# Patient Record
Sex: MALE | Race: WHITE | NOT HISPANIC OR LATINO | Employment: UNEMPLOYED | URBAN - METROPOLITAN AREA
[De-identification: names, ages, dates, MRNs, and addresses within clinical notes are randomized per-mention and may not be internally consistent; named-entity substitution may affect disease eponyms.]

---

## 2022-11-21 ENCOUNTER — OFFICE VISIT (OUTPATIENT)
Dept: URGENT CARE | Facility: CLINIC | Age: 27
End: 2022-11-21
Payer: MEDICAID

## 2022-11-21 VITALS
DIASTOLIC BLOOD PRESSURE: 78 MMHG | HEIGHT: 72 IN | OXYGEN SATURATION: 99 % | WEIGHT: 210 LBS | HEART RATE: 85 BPM | SYSTOLIC BLOOD PRESSURE: 142 MMHG | TEMPERATURE: 99 F | BODY MASS INDEX: 28.44 KG/M2 | RESPIRATION RATE: 18 BRPM

## 2022-11-21 DIAGNOSIS — R07.9 CHEST PAIN, UNSPECIFIED TYPE: Primary | ICD-10-CM

## 2022-11-21 DIAGNOSIS — R00.2 PALPITATIONS: ICD-10-CM

## 2022-11-21 LAB
EKG 12-LEAD: NORMAL
PR INTERVAL: NORMAL
PRT AXES: NORMAL
QRS DURATION: NORMAL
QT/QTC: NORMAL
VENTRICULAR RATE: NORMAL

## 2022-11-21 PROCEDURE — 93000 ELECTROCARDIOGRAM COMPLETE: CPT | Mod: ,,, | Performed by: PHYSICIAN ASSISTANT

## 2022-11-21 PROCEDURE — 99203 PR OFFICE/OUTPT VISIT, NEW, LEVL III, 30-44 MIN: ICD-10-PCS | Mod: ,,, | Performed by: PHYSICIAN ASSISTANT

## 2022-11-21 PROCEDURE — 99203 OFFICE O/P NEW LOW 30 MIN: CPT | Mod: ,,, | Performed by: PHYSICIAN ASSISTANT

## 2022-11-21 PROCEDURE — 93000 POCT EKG 12-LEAD: ICD-10-PCS | Mod: ,,, | Performed by: PHYSICIAN ASSISTANT

## 2022-11-21 RX ORDER — OMEPRAZOLE 20 MG/1
20 CAPSULE, DELAYED RELEASE ORAL DAILY
COMMUNITY
End: 2023-01-26 | Stop reason: SDUPTHER

## 2022-11-21 NOTE — PROGRESS NOTES
Subjective:       Patient ID: Mamadou Isaacs is a 27 y.o. male.    Vitals:  height is 6' (1.829 m) and weight is 95.3 kg (210 lb). His oral temperature is 98.5 °F (36.9 °C). His blood pressure is 142/78 (abnormal) and his pulse is 85. His respiration is 18 and oxygen saturation is 99%.     Chief Complaint: Shortness of Breath    SOB, chest tightness, heart racing, dizziness, blacking out feeling intermittently. Had episode yesterday afternoon and this am.  Patient denies any personal past medical history of cardiac or pulmonary disease.  Does have a family history of cardiac disease including atrial fibrillation.  ROS    Objective:      Physical Exam   HENT:   Head: Normocephalic and atraumatic.   Nose: Nose normal.   Cardiovascular: Normal rate, regular rhythm and normal heart sounds.   Pulmonary/Chest: Effort normal and breath sounds normal.   Abdominal: Normal appearance.   Neurological: He is alert.   Psychiatric: His behavior is normal. Mood normal.         EKG : tachycardia.  No observed acute ST-T wave changes.         Previous History      Review of patient's allergies indicates:  No Known Allergies    Past Medical History:   Diagnosis Date    GERD (gastroesophageal reflux disease)      Current Outpatient Medications   Medication Instructions    omeprazole (PRILOSEC) 20 mg, Oral, Daily     Past Surgical History:   Procedure Laterality Date    WISDOM TOOTH EXTRACTION       Family History   Problem Relation Age of Onset    No Known Problems Mother     No Known Problems Father     Atrial fibrillation Maternal Grandmother        Social History     Tobacco Use    Smoking status: Every Day     Types: Cigarettes     Passive exposure: Never    Smokeless tobacco: Never   Substance Use Topics    Alcohol use: Yes    Drug use: Not Currently     Comment: past drug use        Physical Exam      Vital Signs Reviewed   BP (!) 142/78   Pulse 85   Temp 98.5 °F (36.9 °C) (Oral)   Resp 18   Ht 6' (1.829 m)   Wt 95.3 kg  (210 lb)   SpO2 99%   BMI 28.48 kg/m²        Procedures    Procedures     Labs   No results found for this or any previous visit.    Assessment:       1. Chest pain, unspecified type    2. Palpitations          Plan:         Chest pain, unspecified type  -     POCT EKG 12-LEAD (NOT FOR [facility] USE)    Palpitations  -     POCT EKG 12-LEAD (NOT FOR [facility] USE)         As discussed, it is recommended that you present to the ER now for further evaluation to prevent a delay in care.   Offered transport via EMS but patient/family declines   Opts for private transport via personal vehicle.

## 2023-01-26 ENCOUNTER — OFFICE VISIT (OUTPATIENT)
Dept: FAMILY MEDICINE | Facility: CLINIC | Age: 28
End: 2023-01-26
Payer: MEDICAID

## 2023-01-26 VITALS
SYSTOLIC BLOOD PRESSURE: 128 MMHG | TEMPERATURE: 98 F | HEART RATE: 97 BPM | HEIGHT: 72 IN | BODY MASS INDEX: 31.29 KG/M2 | DIASTOLIC BLOOD PRESSURE: 84 MMHG | OXYGEN SATURATION: 100 % | RESPIRATION RATE: 18 BRPM | WEIGHT: 231 LBS

## 2023-01-26 DIAGNOSIS — F41.9 ANXIETY: Primary | ICD-10-CM

## 2023-01-26 DIAGNOSIS — R00.2 PALPITATIONS: ICD-10-CM

## 2023-01-26 DIAGNOSIS — K21.9 GASTROESOPHAGEAL REFLUX DISEASE WITHOUT ESOPHAGITIS: ICD-10-CM

## 2023-01-26 DIAGNOSIS — Z76.89 ENCOUNTER TO ESTABLISH CARE WITH NEW DOCTOR: ICD-10-CM

## 2023-01-26 PROCEDURE — 1160F PR REVIEW ALL MEDS BY PRESCRIBER/CLIN PHARMACIST DOCUMENTED: ICD-10-PCS | Mod: CPTII,,, | Performed by: NURSE PRACTITIONER

## 2023-01-26 PROCEDURE — 3074F PR MOST RECENT SYSTOLIC BLOOD PRESSURE < 130 MM HG: ICD-10-PCS | Mod: CPTII,,, | Performed by: NURSE PRACTITIONER

## 2023-01-26 PROCEDURE — 3079F DIAST BP 80-89 MM HG: CPT | Mod: CPTII,,, | Performed by: NURSE PRACTITIONER

## 2023-01-26 PROCEDURE — 99214 PR OFFICE/OUTPT VISIT, EST, LEVL IV, 30-39 MIN: ICD-10-PCS | Mod: S$PBB,,, | Performed by: NURSE PRACTITIONER

## 2023-01-26 PROCEDURE — 1159F MED LIST DOCD IN RCRD: CPT | Mod: CPTII,,, | Performed by: NURSE PRACTITIONER

## 2023-01-26 PROCEDURE — 99214 OFFICE O/P EST MOD 30 MIN: CPT | Mod: S$PBB,,, | Performed by: NURSE PRACTITIONER

## 2023-01-26 PROCEDURE — 1160F RVW MEDS BY RX/DR IN RCRD: CPT | Mod: CPTII,,, | Performed by: NURSE PRACTITIONER

## 2023-01-26 PROCEDURE — 3079F PR MOST RECENT DIASTOLIC BLOOD PRESSURE 80-89 MM HG: ICD-10-PCS | Mod: CPTII,,, | Performed by: NURSE PRACTITIONER

## 2023-01-26 PROCEDURE — 99215 OFFICE O/P EST HI 40 MIN: CPT | Mod: PBBFAC | Performed by: NURSE PRACTITIONER

## 2023-01-26 PROCEDURE — 93005 ELECTROCARDIOGRAM TRACING: CPT

## 2023-01-26 PROCEDURE — 3074F SYST BP LT 130 MM HG: CPT | Mod: CPTII,,, | Performed by: NURSE PRACTITIONER

## 2023-01-26 PROCEDURE — 3008F PR BODY MASS INDEX (BMI) DOCUMENTED: ICD-10-PCS | Mod: CPTII,,, | Performed by: NURSE PRACTITIONER

## 2023-01-26 PROCEDURE — 3008F BODY MASS INDEX DOCD: CPT | Mod: CPTII,,, | Performed by: NURSE PRACTITIONER

## 2023-01-26 PROCEDURE — 1159F PR MEDICATION LIST DOCUMENTED IN MEDICAL RECORD: ICD-10-PCS | Mod: CPTII,,, | Performed by: NURSE PRACTITIONER

## 2023-01-26 RX ORDER — BUSPIRONE HYDROCHLORIDE 5 MG/1
5 TABLET ORAL DAILY PRN
Qty: 30 TABLET | Refills: 1 | Status: SHIPPED | OUTPATIENT
Start: 2023-01-26 | End: 2023-03-06

## 2023-01-26 RX ORDER — OMEPRAZOLE 40 MG/1
40 CAPSULE, DELAYED RELEASE ORAL EVERY MORNING
Qty: 30 CAPSULE | Refills: 3 | Status: SHIPPED | OUTPATIENT
Start: 2023-01-26 | End: 2023-11-08 | Stop reason: SDUPTHER

## 2023-01-26 RX ORDER — OMEPRAZOLE 40 MG/1
1 CAPSULE, DELAYED RELEASE ORAL DAILY
COMMUNITY
End: 2023-01-26 | Stop reason: SDUPTHER

## 2023-01-26 RX ORDER — SUCRALFATE 1 G/1
1 TABLET ORAL 4 TIMES DAILY
Qty: 28 TABLET | Refills: 0 | Status: SHIPPED | OUTPATIENT
Start: 2023-01-26 | End: 2023-02-02

## 2023-01-26 NOTE — PROGRESS NOTES
Patient Name: Mamadou Isaacs   : 1995  MRN: [id number]     SUBJECTIVE DATA:    CHIEF COMPLAINT:   Mamadou Isaacs is a 27 y.o. male who presents to clinic today with Establish Care, Gastroesophageal Reflux, and Palpitations        HPI:  27-year-old male presents to the clinic with accompanied his mother to establish care.  Patient like to discuss gastroesophageal reflux, palpitation, anxiety.    Depression and anxiety:  PHQ -2 score is 0, SHI-7 score is 4.  Mother state her son has a history of drug abuse, previously was on Suboxone for detox.  Past medical medication citalopram where he stopped it abruptly started having psychosis.  patient stating he has been having anxiety where he started taking his grand mother Klonopin 0.1 mg as needed.  Patient state he takes it at least 3 times a week and it seems to help.  Instructed patient to stop taking medications not prescribed to him.  We discussed BusPar 5 mg p.o. once daily as needed for anxiety until he sees nurse practitioner Neeta Vera for depression medications that can target anxiety at the same time without the addiction properties.  Patient and his mother agreed for this temporary prescription of BusPar.  Precaution discussed.  Instructed patient if symptoms worsens to go to nearest emergency department or call 911.    GERD:  Patient  report gastroesophageal reflux for quite Some time.  Previously Pepcid.  Has abdominal attacks with tightness at epigastrium region.  Denies nausea vomiting.  He is taking omeprazole 40 mg p.o. but is not helping.  He is trying to monitor his dietary intake/GERD diet.   Seen at emergency department on 2022, ultrasound abdomen limited was performed with unremarkable gallbladder and surrounding structures. Discussed referral to gastro enterology for further testing and evaluation.  Will prescribe Carafate 1 g for 14 days.  Continue omeprazole 40 mg p.o. once daily. GERD education material provided.      Palpitation:  Blood pressure 128/84 with a apical pulse of 96 beats per minute.  Patient report having episodic chest tightness that start at the epigastrium region and then radiate to his left-sided chest, neck, and jaw.  He was seen in November 2022 at a local urgent care where was sent to emergency department further testing and evaluation.  Patient was discharged with 24 hour Holter monitor result shows no evidence of sustained arrhythmia or AV block.  Troponin  <0.01, CPK 0.3.Mag 1.9, K 4.3.  Patient was discharged from the ED at that time.  Patient returned to emergency department on December 13, 2022, D-dimer elevated at 1.83, troponin< 0.01, CT angiogram preliminary shows No acute cardiopulmonary abnormality. No evidence of pulmonary emboli, airspace disease or aortic dissection.  Ultrasound abdomen limited unremarkable gallbladder and surrounding structures.  Patient was discharge with hydroxyzine 25 mg p.o. q.i.d. as needed for anxiety.  Was asked to follow-up with PCP.  Last palpitation attack was a week ago.  EKG in clinic shows normal sinus rhythm with possible left atrial enlargement.  Cardiology referral initiated.    Discussed care gaps:  Patient to return in 4 weeks for a follow-up and fasting blood work.   HPI      ALLERGIES: Review of patient's allergies indicates:  No Known Allergies      ROS:  Review of Systems   Gastrointestinal:  Positive for heartburn.   Psychiatric/Behavioral:  The patient is nervous/anxious.    All other systems reviewed and are negative.      OBJECTIVE DATA:  Vital signs  Vitals:    01/26/23 1252   BP: 128/84   Pulse: 97   Resp: 18   Temp: 97.8 °F (36.6 °C)   TempSrc: Oral   SpO2: 100%   Weight: 104.8 kg (231 lb)   Height: 6' (1.829 m)      Body mass index is 31.33 kg/m².    PHYSICAL EXAM:   Physical Exam  Vitals and nursing note reviewed.   Constitutional:       General: He is not in acute distress.     Appearance: Normal appearance. He is normal weight. He is not  ill-appearing, toxic-appearing or diaphoretic.   HENT:      Head: Normocephalic and atraumatic.      Right Ear: Tympanic membrane, ear canal and external ear normal.      Left Ear: Tympanic membrane, ear canal and external ear normal.      Nose: Nose normal.      Mouth/Throat:      Lips: Pink.      Mouth: Mucous membranes are moist.      Pharynx: Oropharynx is clear. Uvula midline.   Eyes:      General: Lids are normal. Gaze aligned appropriately.      Extraocular Movements: Extraocular movements intact.      Conjunctiva/sclera: Conjunctivae normal.      Pupils: Pupils are equal, round, and reactive to light.   Neck:      Thyroid: No thyroid mass, thyromegaly or thyroid tenderness.      Trachea: Trachea and phonation normal.   Cardiovascular:      Rate and Rhythm: Normal rate and regular rhythm.      Pulses: Normal pulses.           Radial pulses are 2+ on the right side and 2+ on the left side.      Heart sounds: Normal heart sounds. No murmur heard.  Pulmonary:      Effort: Pulmonary effort is normal.      Breath sounds: Normal breath sounds and air entry. No wheezing or rhonchi.   Abdominal:      General: Bowel sounds are normal.      Palpations: Abdomen is soft.      Tenderness: There is abdominal tenderness. There is no right CVA tenderness or left CVA tenderness.      Hernia: There is no hernia in the umbilical area or ventral area.       Musculoskeletal:         General: Normal range of motion.      Cervical back: Full passive range of motion without pain, normal range of motion and neck supple. No rigidity or tenderness.   Lymphadenopathy:      Cervical: No cervical adenopathy.   Skin:     General: Skin is warm.      Capillary Refill: Capillary refill takes less than 2 seconds.   Neurological:      General: No focal deficit present.      Mental Status: He is alert and oriented to person, place, and time. Mental status is at baseline.      GCS: GCS eye subscore is 4. GCS verbal subscore is 5. GCS motor  subscore is 6.      Cranial Nerves: Cranial nerves 2-12 are intact.      Sensory: Sensation is intact.      Coordination: Coordination is intact.      Gait: Gait is intact.   Psychiatric:         Attention and Perception: Attention and perception normal.         Mood and Affect: Mood and affect normal.         Speech: Speech normal.         Behavior: Behavior normal. Behavior is cooperative.         Thought Content: Thought content normal.         Cognition and Memory: Cognition and memory normal.         Judgment: Judgment normal.        ASSESSMENT/PLAN:  1. Anxiety  Assessment & Plan:  Rx BuSpar 5 mg p.o. as needed for anxiety.  Herson -7 score is 4.  Stop taking grandmother's medications for anxiety.  If symptoms worsens go to nearest emergency department.  Behavioral health referral initiated.  Questions solicited and answered patient verbalized.    Orders:  -     busPIRone (BUSPAR) 5 MG Tab; Take 1 tablet (5 mg total) by mouth daily as needed (anxiety).  Dispense: 30 tablet; Refill: 1  -     Ambulatory referral/consult to Family Practice; Future; Expected date: 02/02/2023    2. Palpitations  Assessment & Plan:  EKG performed in the clinic.  Normal sinus rhythm with possible left atrial enlargement.  Instructed patient if symptoms worsens to go to nearest emergency department or call 911.  Cardiology referral initiated.    Orders:  -     EKG 12-lead  -     Ambulatory referral/consult to Cardiology; Future; Expected date: 02/02/2023    3. Gastroesophageal reflux disease without esophagitis  Assessment & Plan:  Rx Carafate 1 g p.o. take 30 minutes before meals.  For 14 days.  If you start having constipation use OTC MiraLax as directed on the label.  Read discharge education material regarding GERD.  Continue omeprazole 40 mg p.o. once daily.  Gastroenterology referral initiated.  Return to clinic as needed.    Orders:  -     omeprazole (PRILOSEC) 40 MG capsule; Take 1 capsule (40 mg total) by mouth every morning.   Dispense: 30 capsule; Refill: 3  -     sucralfate (CARAFATE) 1 gram tablet; Take 1 tablet (1 g total) by mouth 4 (four) times daily. for 7 days  Dispense: 28 tablet; Refill: 0  -     Ambulatory referral/consult to Gastroenterology; Future; Expected date: 02/02/2023    4. Encounter to establish care with new doctor  Assessment & Plan:  Patient to return to clinic in 4 weeks for a follow-up.  Will discuss care gaps on next visit.  Will draw fasting blood work.  Patient verbalized.    Orders:  -     busPIRone (BUSPAR) 5 MG Tab; Take 1 tablet (5 mg total) by mouth daily as needed (anxiety).  Dispense: 30 tablet; Refill: 1  -     sucralfate (CARAFATE) 1 gram tablet; Take 1 tablet (1 g total) by mouth 4 (four) times daily. for 7 days  Dispense: 28 tablet; Refill: 0  -     Ambulatory referral/consult to Gastroenterology; Future; Expected date: 02/02/2023  -     Ambulatory referral/consult to Cardiology; Future; Expected date: 02/02/2023  -     Ambulatory referral/consult to Family Practice; Future; Expected date: 02/02/2023           RESULTS:  No results found for this or any previous visit (from the past 1008 hour(s)).      Follow Up:  Follow up in about 4 weeks (around 2/23/2023).      Previous medical history/lab work/radiology reviewed and considered during medical management decisions.   Medication list reviewed and medication reconciliation performed.  Patient was provided  and care about his/her current diagnosis (es) and medications including risk/benefit and side effects/adverse events, over the counter medication uses/doses, home self-care and contact precautions,  and red flags and indications for when to seek immediate medical attention.   Patient was advised to continue compliance with current medication list and medical recommendations.  Patient dvised continued compliance with recommended eating habits/ diets for medical conditions and exercise 150 minutes/ week (if possible) for medical condition  (s).  Educational handouts and instructions on selected disease management in AVS (After Visit Summary).    All of the patient's questions were answered to patient's satisfaction.   The patient was receptive, expressed verbal understanding and agreement the above plan.           This note was created with the assistance of a voice recognition software or phone dictation. There may be transcription errors as a result of using this technology however minimal. Effort has been made to assure accuracy of transcription but any obvious errors or omissions should be clarified with the author of the document

## 2023-01-26 NOTE — ASSESSMENT & PLAN NOTE
Rx Carafate 1 g p.o. take 30 minutes before meals.  For 14 days.  If you start having constipation use OTC MiraLax as directed on the label.  Read discharge education material regarding GERD.  Continue omeprazole 40 mg p.o. once daily.  Gastroenterology referral initiated.  Return to clinic as needed.

## 2023-01-26 NOTE — ASSESSMENT & PLAN NOTE
Patient to return to clinic in 4 weeks for a follow-up.  Will discuss care gaps on next visit.  Will draw fasting blood work.  Patient verbalized.

## 2023-01-26 NOTE — ASSESSMENT & PLAN NOTE
Rx BuSpar 5 mg p.o. as needed for anxiety.  Herson -7 score is 4.  Stop taking grandmother's medications for anxiety.  If symptoms worsens go to nearest emergency department.  Behavioral health referral initiated.  Questions solicited and answered patient verbalized.

## 2023-01-26 NOTE — ASSESSMENT & PLAN NOTE
EKG performed in the clinic.  Normal sinus rhythm with possible left atrial enlargement.  Instructed patient if symptoms worsens to go to nearest emergency department or call 911.  Cardiology referral initiated.

## 2023-01-30 NOTE — PROGRESS NOTES
Please notify the patient that the EKG that was done in the clinic has been read by the cardiologist and results shows normal sinus rhythm no abnormalities.

## 2023-01-31 ENCOUNTER — TELEPHONE (OUTPATIENT)
Dept: FAMILY MEDICINE | Facility: CLINIC | Age: 28
End: 2023-01-31
Payer: MEDICAID

## 2023-01-31 NOTE — TELEPHONE ENCOUNTER
----- Message from PHAN Hemphill sent at 1/30/2023  7:50 AM CST -----  Please notify the patient that the EKG that was done in the clinic has been read by the cardiologist and results shows normal sinus rhythm no abnormalities.

## 2023-03-06 ENCOUNTER — OFFICE VISIT (OUTPATIENT)
Dept: CARDIOLOGY | Facility: CLINIC | Age: 28
End: 2023-03-06
Payer: MEDICAID

## 2023-03-06 VITALS
HEIGHT: 72 IN | OXYGEN SATURATION: 100 % | HEART RATE: 80 BPM | BODY MASS INDEX: 30.99 KG/M2 | WEIGHT: 228.81 LBS | RESPIRATION RATE: 18 BRPM | TEMPERATURE: 98 F | DIASTOLIC BLOOD PRESSURE: 95 MMHG | SYSTOLIC BLOOD PRESSURE: 130 MMHG

## 2023-03-06 DIAGNOSIS — F17.200 SMOKING: ICD-10-CM

## 2023-03-06 DIAGNOSIS — R07.89 CHEST TIGHTNESS: ICD-10-CM

## 2023-03-06 DIAGNOSIS — R00.2 PALPITATIONS: Primary | ICD-10-CM

## 2023-03-06 DIAGNOSIS — R03.0 ELEVATED BP WITHOUT DIAGNOSIS OF HYPERTENSION: ICD-10-CM

## 2023-03-06 DIAGNOSIS — Z76.89 ENCOUNTER TO ESTABLISH CARE WITH NEW DOCTOR: ICD-10-CM

## 2023-03-06 PROBLEM — R07.9 CHEST PAIN: Status: RESOLVED | Noted: 2022-11-21 | Resolved: 2023-03-06

## 2023-03-06 PROCEDURE — 99214 OFFICE O/P EST MOD 30 MIN: CPT | Mod: PBBFAC | Performed by: INTERNAL MEDICINE

## 2023-03-06 NOTE — PROGRESS NOTES
CHIEF COMPLAINT:   Chief Complaint   Patient presents with    Palpitations    Follow-up     Palpitations cp tightness and jaw tightness also wore 48 holter with neg results                   Review of patient's allergies indicates:  No Known Allergies                                       HPI:  Mamadou Isaacs 28 y.o. male with GERD, previous opiate use disorder  who is referred for further evaluation of palpitations and associated chest tightness that has been occurring since Nov 2022. Pt had 2 ED visits at [facility] in Nov and Dec 2022 for palpitations, chest pain and presyncope. He had EKGs done that were WNL (report only available). Trop negative in both ED visits. He wore a 48 hour Holter monitor that was reportedly normal.   He reports episodes have been occurring less frequent recently. They occur randomly, about once or twice a week and usually occur in the afternoon. Episodes last 2 hours to half a day. He denies exertional dyspnea but reports if he overexerts himself at work he experiences some palpitations and chest tightness ans has been taking it easy.                                                                                                                                                                                          Family hx:  Maternal GM Afib, maternal uncle afib, paternal GF had MI in the 40s, father HTN    Social hx:   Works in [employer]), smokes < 1/2 ppd, 5py hx, occasional alcohol, used to use opiates and heroin but has been clean for 6 years                                         Patient Active Problem List   Diagnosis    Palpitations    Chest pain    Gastroesophageal reflux disease    Anxiety    Encounter to establish care with new doctor     Past Surgical History:   Procedure Laterality Date    WISDOM TOOTH EXTRACTION       Social History     Socioeconomic History    Marital status: Single   Tobacco Use    Smoking status: Every Day     Packs/day: 0.50     Years:  8.00     Pack years: 4.00     Types: Cigarettes     Passive exposure: Never    Smokeless tobacco: Never   Substance and Sexual Activity    Alcohol use: Yes     Alcohol/week: 12.0 standard drinks     Types: 12 Cans of beer per week    Drug use: Not Currently     Types: Benzodiazepines, Marijuana     Comment: past drug use.  Heroin.  Oxycodone.    Sexual activity: Yes     Partners: Female        Family History   Problem Relation Age of Onset    Depression Mother     Hyperlipidemia Mother     Hypertension Mother     Hypertension Father     Diabetes Father     Hyperlipidemia Father     Atrial fibrillation Maternal Grandmother     Depression Maternal Grandmother     Cancer Maternal Grandfather     Diabetes Paternal Grandfather          Current Outpatient Medications:     omeprazole (PRILOSEC) 40 MG capsule, Take 1 capsule (40 mg total) by mouth every morning., Disp: 30 capsule, Rfl: 3     ROS:                                                                                                                                                                             ROS   As per HPI    Blood pressure (!) 146/102, pulse 80, temperature 98 °F (36.7 °C), resp. rate 18, height 6' (1.829 m), weight 103.8 kg (228 lb 12.8 oz), SpO2 100 %.   PE:  Physical Exam   General: alert and oriented/no acute distress  Eye: EOMI/normal conjunctiva/no xanthelasma  HENT: normocephalic/moist oral mucosa  Neck: supple/nontender/no carotid bruit  Respiratory: lungs CTA/nonlabored respirations/BS equal/symmetrical expansion/no  chest wall tenderness  Cardiovascular: normal rate/normal rhythm/no murmur/normal peripheral perfusion/no  edema/no JVD  Gastrointestinal: soft/nontender  Musculoskeletal: normal ROM  Integumentary: warm/dry/pink/intact  Neurologic: alert/oriented/normal sensory/no focal deficits  Psychiatric: cooperative/appropriate mood and affect/normal judgment          ASSESSMENT/PLAN  Palpitations  Chest tightness  EKG today and in Jan  2023 show NSR  ED visits at [facility] with reportedly normal EKG, also normal trop in 2 ED visits  As symptoms not daily, will obtain a longer monitor for 2 weeks  Given smoking hx and family hx of CAD, will obtain a ETT   Will also obtain an echocardiogram to rule out structural heart disease      Smoking  Discussed deleterious effects of smoking and how smoking decreases lifespan.  Pt wants to quit and wants to try on his own  Explained that resources are available if needed including smoking cessation program     Elevated BP without diagnosis of HTN  /102 with repeat 130/95  Recent BP in clinic was 128/84  Advised pt to keep a BP log to review on next visit or with PCP      4m F/U

## 2023-03-08 ENCOUNTER — OFFICE VISIT (OUTPATIENT)
Dept: FAMILY MEDICINE | Facility: CLINIC | Age: 28
End: 2023-03-08
Payer: MEDICAID

## 2023-03-08 VITALS
TEMPERATURE: 98 F | RESPIRATION RATE: 16 BRPM | OXYGEN SATURATION: 99 % | SYSTOLIC BLOOD PRESSURE: 133 MMHG | BODY MASS INDEX: 31.15 KG/M2 | WEIGHT: 230 LBS | HEIGHT: 72 IN | HEART RATE: 85 BPM | DIASTOLIC BLOOD PRESSURE: 77 MMHG

## 2023-03-08 DIAGNOSIS — Z11.59 ENCOUNTER FOR HEPATITIS C SCREENING TEST FOR LOW RISK PATIENT: ICD-10-CM

## 2023-03-08 DIAGNOSIS — Z13.6 ENCOUNTER FOR LIPID SCREENING FOR CARDIOVASCULAR DISEASE: ICD-10-CM

## 2023-03-08 DIAGNOSIS — Z11.4 ENCOUNTER FOR HIV (HUMAN IMMUNODEFICIENCY VIRUS) TEST: ICD-10-CM

## 2023-03-08 DIAGNOSIS — Z13.220 ENCOUNTER FOR LIPID SCREENING FOR CARDIOVASCULAR DISEASE: ICD-10-CM

## 2023-03-08 DIAGNOSIS — R21 RASH: ICD-10-CM

## 2023-03-08 DIAGNOSIS — Z00.00 ANNUAL PHYSICAL EXAM: Primary | ICD-10-CM

## 2023-03-08 LAB
ALBUMIN SERPL-MCNC: 4.2 G/DL (ref 3.5–5)
ALBUMIN/GLOB SERPL: 1 RATIO (ref 1.1–2)
ALP SERPL-CCNC: 94 UNIT/L (ref 40–150)
ALT SERPL-CCNC: 20 UNIT/L (ref 0–55)
AST SERPL-CCNC: 23 UNIT/L (ref 5–34)
BILIRUB UR QL STRIP: NEGATIVE
BILIRUBIN DIRECT+TOT PNL SERPL-MCNC: 0.7 MG/DL
BUN SERPL-MCNC: 8 MG/DL (ref 8.9–20.6)
CALCIUM SERPL-MCNC: 9.8 MG/DL (ref 8.4–10.2)
CHLORIDE SERPL-SCNC: 102 MMOL/L (ref 98–107)
CHOLEST SERPL-MCNC: 170 MG/DL
CHOLEST/HDLC SERPL: 4 {RATIO} (ref 0–5)
CO2 SERPL-SCNC: 27 MMOL/L (ref 22–29)
CREAT SERPL-MCNC: 0.81 MG/DL (ref 0.73–1.18)
ERYTHROCYTE [DISTWIDTH] IN BLOOD BY AUTOMATED COUNT: 14.2 % (ref 11.5–17)
GFR SERPLBLD CREATININE-BSD FMLA CKD-EPI: >60 MLS/MIN/1.73/M2
GLOBULIN SER-MCNC: 4.3 GM/DL (ref 2.4–3.5)
GLUCOSE SERPL-MCNC: 86 MG/DL (ref 74–100)
GLUCOSE UR QL STRIP: NEGATIVE
HCT VFR BLD AUTO: 49.5 % (ref 42–52)
HCV AB SERPL QL IA: NONREACTIVE
HDLC SERPL-MCNC: 38 MG/DL (ref 35–60)
HGB BLD-MCNC: 16.3 G/DL (ref 14–18)
HIV 1+2 AB+HIV1 P24 AG SERPL QL IA: NONREACTIVE
KETONES UR QL STRIP: NEGATIVE
LDLC SERPL CALC-MCNC: 98 MG/DL (ref 50–140)
LEUKOCYTE ESTERASE UR QL STRIP: NEGATIVE
MCH RBC QN AUTO: 28 PG
MCHC RBC AUTO-ENTMCNC: 32.9 G/DL (ref 33–36)
MCV RBC AUTO: 85.1 FL (ref 80–94)
NRBC BLD AUTO-RTO: 0 %
PH, POC UA: 8.5
PLATELET # BLD AUTO: 270 X10(3)/MCL (ref 130–400)
PMV BLD AUTO: 10.8 FL (ref 7.4–10.4)
POC BLOOD, URINE: NEGATIVE
POC NITRATES, URINE: NEGATIVE
POTASSIUM SERPL-SCNC: 4.1 MMOL/L (ref 3.5–5.1)
PROT SERPL-MCNC: 8.5 GM/DL (ref 6.4–8.3)
PROT UR QL STRIP: NEGATIVE
RBC # BLD AUTO: 5.82 X10(6)/MCL (ref 4.7–6.1)
SODIUM SERPL-SCNC: 138 MMOL/L (ref 136–145)
SP GR UR STRIP: 1.01 (ref 1–1.03)
TRIGL SERPL-MCNC: 171 MG/DL (ref 34–140)
TSH SERPL-ACNC: 1.02 UIU/ML (ref 0.35–4.94)
UROBILINOGEN UR STRIP-ACNC: 0.2 (ref 0.3–2.2)
VLDLC SERPL CALC-MCNC: 34 MG/DL
WBC # SPEC AUTO: 11.7 X10(3)/MCL (ref 4.5–11.5)

## 2023-03-08 PROCEDURE — 84443 ASSAY THYROID STIM HORMONE: CPT | Performed by: NURSE PRACTITIONER

## 2023-03-08 PROCEDURE — 1159F PR MEDICATION LIST DOCUMENTED IN MEDICAL RECORD: ICD-10-PCS | Mod: CPTII,,, | Performed by: NURSE PRACTITIONER

## 2023-03-08 PROCEDURE — 3075F SYST BP GE 130 - 139MM HG: CPT | Mod: CPTII,,, | Performed by: NURSE PRACTITIONER

## 2023-03-08 PROCEDURE — 3008F BODY MASS INDEX DOCD: CPT | Mod: CPTII,,, | Performed by: NURSE PRACTITIONER

## 2023-03-08 PROCEDURE — 3078F PR MOST RECENT DIASTOLIC BLOOD PRESSURE < 80 MM HG: ICD-10-PCS | Mod: CPTII,,, | Performed by: NURSE PRACTITIONER

## 2023-03-08 PROCEDURE — 1159F MED LIST DOCD IN RCRD: CPT | Mod: CPTII,,, | Performed by: NURSE PRACTITIONER

## 2023-03-08 PROCEDURE — 3075F PR MOST RECENT SYSTOLIC BLOOD PRESS GE 130-139MM HG: ICD-10-PCS | Mod: CPTII,,, | Performed by: NURSE PRACTITIONER

## 2023-03-08 PROCEDURE — 3078F DIAST BP <80 MM HG: CPT | Mod: CPTII,,, | Performed by: NURSE PRACTITIONER

## 2023-03-08 PROCEDURE — 36415 COLL VENOUS BLD VENIPUNCTURE: CPT | Performed by: NURSE PRACTITIONER

## 2023-03-08 PROCEDURE — 80053 COMPREHEN METABOLIC PANEL: CPT | Performed by: NURSE PRACTITIONER

## 2023-03-08 PROCEDURE — 86803 HEPATITIS C AB TEST: CPT | Performed by: NURSE PRACTITIONER

## 2023-03-08 PROCEDURE — 1160F RVW MEDS BY RX/DR IN RCRD: CPT | Mod: CPTII,,, | Performed by: NURSE PRACTITIONER

## 2023-03-08 PROCEDURE — 99214 OFFICE O/P EST MOD 30 MIN: CPT | Mod: S$PBB,,, | Performed by: NURSE PRACTITIONER

## 2023-03-08 PROCEDURE — 1160F PR REVIEW ALL MEDS BY PRESCRIBER/CLIN PHARMACIST DOCUMENTED: ICD-10-PCS | Mod: CPTII,,, | Performed by: NURSE PRACTITIONER

## 2023-03-08 PROCEDURE — 87389 HIV-1 AG W/HIV-1&-2 AB AG IA: CPT | Performed by: NURSE PRACTITIONER

## 2023-03-08 PROCEDURE — 99214 PR OFFICE/OUTPT VISIT, EST, LEVL IV, 30-39 MIN: ICD-10-PCS | Mod: S$PBB,,, | Performed by: NURSE PRACTITIONER

## 2023-03-08 PROCEDURE — 99214 OFFICE O/P EST MOD 30 MIN: CPT | Mod: PBBFAC | Performed by: NURSE PRACTITIONER

## 2023-03-08 PROCEDURE — 85027 COMPLETE CBC AUTOMATED: CPT | Performed by: NURSE PRACTITIONER

## 2023-03-08 PROCEDURE — 80061 LIPID PANEL: CPT | Performed by: NURSE PRACTITIONER

## 2023-03-08 PROCEDURE — 81003 URINALYSIS AUTO W/O SCOPE: CPT | Mod: PBBFAC | Performed by: NURSE PRACTITIONER

## 2023-03-08 PROCEDURE — 3008F PR BODY MASS INDEX (BMI) DOCUMENTED: ICD-10-PCS | Mod: CPTII,,, | Performed by: NURSE PRACTITIONER

## 2023-03-08 RX ORDER — HYDROCORTISONE 25 MG/G
OINTMENT TOPICAL 2 TIMES DAILY
Qty: 28 G | Refills: 1 | Status: SHIPPED | OUTPATIENT
Start: 2023-03-08 | End: 2023-09-08 | Stop reason: ALTCHOICE

## 2023-03-08 RX ORDER — MUPIROCIN 20 MG/G
OINTMENT TOPICAL
Qty: 22 G | Refills: 1 | Status: SHIPPED | OUTPATIENT
Start: 2023-03-08 | End: 2023-03-15

## 2023-03-08 NOTE — ASSESSMENT & PLAN NOTE
Patient will call the office with dermatologist name for referral. Few area looks infected specially to his lower abdomen and inner thigh.  Rx cortisone topical ointment to be mixed with Rx Bactroban topical ointment b.i.d. 7 days and then as needed.  Maintain good hand hygiene as well body.  Stay hydrated with water.  Questions solicited and answered, patient verbalized.

## 2023-03-08 NOTE — PROGRESS NOTES
Patient Name: Mamadou Isaacs   : 1995  MRN: [id number]     SUBJECTIVE DATA:    CHIEF COMPLAINT:   Mamadou Isaacs is a 28 y.o. male who presents to clinic today with Follow-up (Routine follow up for anxiety and reflux. )        HPI:  27-year-old male presents to the clinic for a yearly physical exam with fasting blood work.    Yearly physical exam completed, fasting blood work initiated as well urinalysis.     Rash:  Patient has history of acne, untreated.  Instructed patient to contact his primary insurance coverage Medicaid and find out name of the dermatologist under his plan.  Patient will call the office with dermatologist name for referral. Few area looks infected specially to his lower abdomen and inner thigh.  Rx cortisone topical ointment to be mixed with Rx Bactroban topical ointment b.i.d. 7 days and then as needed.  Maintain good hand hygiene as well body.  Stay hydrated with water.  Questions solicited and answered, patient verbalized.      Depression and anxiety:  PHQ -2 score is 0, SHI-7 score is 0.  Patient currently takes BuSpar 5 mg p.o. p.r.n. as needed for anxiety.  Patient state he does not use it often.  Patient state he has a follow-up appointment coming up with Carmelina Vera this month for a follow-up.  Denies any suicidal homicide ideation.  Patient to continue medication as prescribed.  Questions solicited and answered, patient verbalized.     GERD:  Patient state upper GI scope with Dr. Jaramillo, results pending.  Patient state omeprazole is helping with reflux. He is trying to monitor his dietary intake/GERD diet.  Awaiting results from Dr. Jaramillo.  Patient denies any nausea or vomiting or diarrhea abdominal pain.     Palpitation:  Blood pressure 133/77 with a apical pulse of 82 beats per minute.  Patient was referred to Cardiology and he was seen on 2023.  Patient to wear Holter monitor and also echo was ordered.  Patient to follow-up with Cardiology.  Denies any  chest pain or short of breath. HPI    Care gaps:  Patient declined smoke cessation, patient continued to decline immunization and was ask not to be asked again regarding vaccination.    Patient denies chest pain, shortness of breath, dyspnea on exertion, palpitations, peripheral edema, abdominal pain, nausea, vomiting, diarrhea, constipation, fatigue, fever, chills, dysuria,  hematuria, melena, or hematochezia.     02/28/2023 patient was seen at [facility] diagnostic center with Dr. Jaramillo, EGD was performed, impressions:  See full report under scanned media  Erythema in the lower 3rd of the esophagus compatible with esophagitis.  Erythemic in the antrum compatible with gastritis with biopsy/pending.  Normal mucosa in the duodenum.  Esophageal hiatal hernia.  Ring in the gastroesophageal junction with dilation/Schatzki's ring.  Esophageal dilation diet provided to resume regular diet in 3 days.  Continue omeprazole.    ALLERGIES: Review of patient's allergies indicates:  No Known Allergies      ROS:  Review of Systems   All other systems reviewed and are negative.      OBJECTIVE DATA:  Vital signs  Vitals:    03/08/23 1011   BP: 133/77   Pulse: 85   Resp: 16   Temp: 97.9 °F (36.6 °C)   SpO2: 99%   Weight: 104.3 kg (230 lb)   Height: 6' (1.829 m)      Body mass index is 31.19 kg/m².    PHYSICAL EXAM:   Physical Exam  Vitals and nursing note reviewed.   Constitutional:       General: He is awake. He is not in acute distress.     Appearance: Normal appearance. He is well-developed, well-groomed and overweight. He is not ill-appearing, toxic-appearing or diaphoretic.   HENT:      Head: Normocephalic and atraumatic.      Right Ear: Hearing, tympanic membrane, ear canal and external ear normal. There is no impacted cerumen.      Left Ear: Hearing, tympanic membrane, ear canal and external ear normal. There is no impacted cerumen.      Nose: Nose normal. No congestion or rhinorrhea.      Right Nostril: No epistaxis.      Left  Nostril: No epistaxis.      Right Turbinates: Not swollen.      Left Turbinates: Not swollen.      Right Sinus: No maxillary sinus tenderness or frontal sinus tenderness.      Left Sinus: No maxillary sinus tenderness or frontal sinus tenderness.      Mouth/Throat:      Lips: Pink.      Mouth: Mucous membranes are moist.      Pharynx: Oropharynx is clear. Uvula midline. No posterior oropharyngeal erythema.   Eyes:      General: Lids are normal. Gaze aligned appropriately.      Extraocular Movements: Extraocular movements intact.      Conjunctiva/sclera: Conjunctivae normal.      Pupils: Pupils are equal, round, and reactive to light.   Neck:      Thyroid: No thyroid mass, thyromegaly or thyroid tenderness.      Trachea: Trachea and phonation normal.   Cardiovascular:      Rate and Rhythm: Normal rate and regular rhythm.      Pulses: Normal pulses.           Radial pulses are 2+ on the right side and 2+ on the left side.        Femoral pulses are 2+ on the right side and 2+ on the left side.       Dorsalis pedis pulses are 2+ on the right side and 2+ on the left side.        Posterior tibial pulses are 2+ on the right side and 2+ on the left side.      Heart sounds: Normal heart sounds.   Pulmonary:      Effort: Pulmonary effort is normal.      Breath sounds: Normal breath sounds and air entry.   Abdominal:      General: Bowel sounds are normal. There is no distension.      Palpations: Abdomen is soft. There is no mass.      Tenderness: There is no abdominal tenderness. There is no right CVA tenderness, left CVA tenderness, guarding or rebound.      Hernia: No hernia is present.   Genitourinary:     Penis: Normal.       Testes: Normal.      Rectum: Normal.   Musculoskeletal:         General: No swelling, tenderness, deformity or signs of injury. Normal range of motion.      Cervical back: Full passive range of motion without pain, normal range of motion and neck supple. No rigidity or tenderness.      Right lower  leg: No edema.      Left lower leg: No edema.   Lymphadenopathy:      Cervical: No cervical adenopathy.      Right cervical: No superficial cervical adenopathy.     Left cervical: No superficial cervical adenopathy.   Skin:     General: Skin is warm.      Capillary Refill: Capillary refill takes less than 2 seconds.      Coloration: Skin is not jaundiced or pale.      Findings: Erythema and rash (Patient is dealing with acne.  Acne like to his lower abdomen and  his inner thighs erythemic skin peeling possibly from rubbing as well scratching his skin.  No drainage, no bleeding.) present. No bruising or lesion.   Neurological:      General: No focal deficit present.      Mental Status: He is alert and oriented to person, place, and time. Mental status is at baseline.      GCS: GCS eye subscore is 4. GCS verbal subscore is 5. GCS motor subscore is 6.      Cranial Nerves: Cranial nerves 2-12 are intact.      Sensory: Sensation is intact.      Motor: Motor function is intact.      Coordination: Coordination is intact.      Gait: Gait is intact. Gait normal.      Deep Tendon Reflexes: Reflexes normal.   Psychiatric:         Attention and Perception: Attention and perception normal.         Mood and Affect: Mood and affect normal.         Speech: Speech normal.         Behavior: Behavior normal. Behavior is cooperative.         Thought Content: Thought content normal.         Cognition and Memory: Cognition and memory normal.         Judgment: Judgment normal.        ASSESSMENT/PLAN:  1. Annual physical exam  -     HIV 1/2 Ag/Ab (4th Gen)  -     Hepatitis C Antibody  -     Lipid Panel  -     CBC Without Differential  -     TSH  -     Comprehensive Metabolic Panel  -     POCT Urinalysis, Dipstick, Automated, W/O Scope  -     hydrocortisone 2.5 % ointment; Apply topically 2 (two) times daily. for 7 days  Dispense: 28 g; Refill: 1  -     mupirocin (BACTROBAN) 2 % ointment; Apply to affected area 2 times daily  Dispense: 22  g; Refill: 1    2. Rash  Assessment & Plan:  Patient will call the office with dermatologist name for referral. Few area looks infected specially to his lower abdomen and inner thigh.  Rx cortisone topical ointment to be mixed with Rx Bactroban topical ointment b.i.d. 7 days and then as needed.  Maintain good hand hygiene as well body.  Stay hydrated with water.  Questions solicited and answered, patient verbalized.    Orders:  -     hydrocortisone 2.5 % ointment; Apply topically 2 (two) times daily. for 7 days  Dispense: 28 g; Refill: 1  -     mupirocin (BACTROBAN) 2 % ointment; Apply to affected area 2 times daily  Dispense: 22 g; Refill: 1    3. Encounter for lipid screening for cardiovascular disease  -     Lipid Panel    4. Encounter for HIV (human immunodeficiency virus) test  -     HIV 1/2 Ag/Ab (4th Gen)    5. Encounter for hepatitis C screening test for low risk patient  -     Hepatitis C Antibody           RESULTS:  Recent Results (from the past 1008 hour(s))   POCT Urinalysis, Dipstick, Automated, W/O Scope    Collection Time: 03/08/23 10:58 AM   Result Value Ref Range    POC Blood, Urine Negative Negative    POC Bilirubin, Urine Negative Negative    POC Urobilinogen, Urine 0.2 (A) 0.3 - 2.2    POC Ketones, Urine Negative Negative    POC Protein, Urine Negative Negative    POC Nitrates, Urine Negative Negative    POC Glucose, Urine Negative Negative    pH, UA 8.5     POC Specific Gravity, Urine 1.015 1.003 - 1.029    POC Leukocytes, Urine Negative Negative         Follow Up:  Follow up in about 6 months (around 9/8/2023).      Previous medical history/lab work/radiology reviewed and considered during medical management decisions.   Medication list reviewed and medication reconciliation performed.  Patient was provided  and care about his/her current diagnosis (es) and medications including risk/benefit and side effects/adverse events, over the counter medication uses/doses, home self-care and  contact precautions,  and red flags and indications for when to seek immediate medical attention.   Patient was advised to continue compliance with current medication list and medical recommendations.  Patient dvised continued compliance with recommended eating habits/ diets for medical conditions and exercise 150 minutes/ week (if possible) for medical condition (s).  Educational handouts and instructions on selected disease management in AVS (After Visit Summary).    All of the patient's questions were answered to patient's satisfaction.   The patient was receptive, expressed verbal understanding and agreement the above plan.       This note was created with the assistance of a voice recognition software or phone dictation. There may be transcription errors as a result of using this technology however minimal. Effort has been made to assure accuracy of transcription but any obvious errors or omissions should be clarified with the author of the document

## 2023-03-09 ENCOUNTER — TELEPHONE (OUTPATIENT)
Dept: FAMILY MEDICINE | Facility: CLINIC | Age: 28
End: 2023-03-09
Payer: MEDICAID

## 2023-03-09 NOTE — TELEPHONE ENCOUNTER
----- Message from Teri Campos sent at 3/9/2023 10:53 AM CST -----  Regarding: Results  Following up Test Results          [  x   ] Labs                                                                                     [     ] X-ray (specify)           [     ] Ultrasound (specify)         [     ] CT (specify)         [     ] MRI (specify)         [     ] MRA (specify)         [     ] Mammogram        [     ] Pulmonary Function Test         [     ] Echocardiogram         [     ] Stress Test        [     ] EEG        [     ] EMG / Nerve Conduction Tests        [     ] Sleep Study        [     ] Holter Monitor      Date of Test: 3/8/2023    Provider: Brooks    Last Visit:     Future Visit: 9/8/2023          Clerical Instructions Given to Patient:                  [   x  ] Message will be sent to Provider                   [   x  ] Clinic Staff will call back for updates on Test Result                  [     ] Clinic Staff attempted to call on ___________ to relay test result.                  [     ] Letter was mailed related to Test Result                  [     ] Patient was encouraged to use the patient portal to view test result.                  [     ] Keep appointment as scheduled.

## 2023-03-09 NOTE — TELEPHONE ENCOUNTER
----- Message from PHAN Hemphill sent at 3/8/2023  5:09 PM CST -----  Please notify patient his blood work and urinalysis within normal range.

## 2023-03-20 ENCOUNTER — HOSPITAL ENCOUNTER (OUTPATIENT)
Dept: CARDIOLOGY | Facility: HOSPITAL | Age: 28
Discharge: HOME | End: 2023-03-20
Attending: INTERNAL MEDICINE
Payer: MEDICAID

## 2023-03-20 VITALS
WEIGHT: 229.94 LBS | RESPIRATION RATE: 20 BRPM | SYSTOLIC BLOOD PRESSURE: 129 MMHG | HEART RATE: 85 BPM | BODY MASS INDEX: 31.14 KG/M2 | DIASTOLIC BLOOD PRESSURE: 85 MMHG | HEIGHT: 72 IN

## 2023-03-20 DIAGNOSIS — R07.89 CHEST TIGHTNESS: ICD-10-CM

## 2023-03-20 LAB
CV STRESS BASE HR: 97 BPM
DIASTOLIC BLOOD PRESSURE: 85 MMHG
OHS CV CPX 85 PERCENT MAX PREDICTED HEART RATE MALE: 164
OHS CV CPX ESTIMATED METS: 13
OHS CV CPX MAX PREDICTED HEART RATE: 193
OHS CV CPX PATIENT IS FEMALE: 0
OHS CV CPX PATIENT IS MALE: 1
OHS CV CPX PEAK DIASTOLIC BLOOD PRESSURE: 80 MMHG
OHS CV CPX PEAK HEAR RATE: 166 BPM
OHS CV CPX PEAK RATE PRESSURE PRODUCT: NORMAL
OHS CV CPX PEAK SYSTOLIC BLOOD PRESSURE: 152 MMHG
OHS CV CPX PERCENT MAX PREDICTED HEART RATE ACHIEVED: 86
OHS CV CPX RATE PRESSURE PRODUCT PRESENTING: NORMAL
STRESS ECHO POST EXERCISE DUR MIN: 9 MINUTES
STRESS ECHO POST EXERCISE DUR SEC: 51 SECONDS
SYSTOLIC BLOOD PRESSURE: 129 MMHG

## 2023-03-20 PROCEDURE — 93017 CV STRESS TEST TRACING ONLY: CPT

## 2023-03-23 ENCOUNTER — OFFICE VISIT (OUTPATIENT)
Dept: BEHAVIORAL HEALTH | Facility: CLINIC | Age: 28
End: 2023-03-23
Payer: MEDICAID

## 2023-03-23 VITALS
BODY MASS INDEX: 31.25 KG/M2 | DIASTOLIC BLOOD PRESSURE: 88 MMHG | WEIGHT: 230.38 LBS | HEART RATE: 92 BPM | SYSTOLIC BLOOD PRESSURE: 135 MMHG | OXYGEN SATURATION: 100 % | TEMPERATURE: 98 F

## 2023-03-23 DIAGNOSIS — F41.9 ANXIETY: ICD-10-CM

## 2023-03-23 DIAGNOSIS — Z76.89 ENCOUNTER TO ESTABLISH CARE WITH NEW DOCTOR: ICD-10-CM

## 2023-03-23 PROCEDURE — 3075F SYST BP GE 130 - 139MM HG: CPT | Mod: CPTII,SA,HB, | Performed by: NURSE PRACTITIONER

## 2023-03-23 PROCEDURE — 3008F PR BODY MASS INDEX (BMI) DOCUMENTED: ICD-10-PCS | Mod: CPTII,SA,HB, | Performed by: NURSE PRACTITIONER

## 2023-03-23 PROCEDURE — 3008F BODY MASS INDEX DOCD: CPT | Mod: CPTII,SA,HB, | Performed by: NURSE PRACTITIONER

## 2023-03-23 PROCEDURE — 3079F PR MOST RECENT DIASTOLIC BLOOD PRESSURE 80-89 MM HG: ICD-10-PCS | Mod: CPTII,SA,HB, | Performed by: NURSE PRACTITIONER

## 2023-03-23 PROCEDURE — 99417 PROLNG OP E/M EACH 15 MIN: CPT | Mod: S$PBB,SA,HB, | Performed by: NURSE PRACTITIONER

## 2023-03-23 PROCEDURE — 1160F PR REVIEW ALL MEDS BY PRESCRIBER/CLIN PHARMACIST DOCUMENTED: ICD-10-PCS | Mod: CPTII,SA,HB, | Performed by: NURSE PRACTITIONER

## 2023-03-23 PROCEDURE — 1159F PR MEDICATION LIST DOCUMENTED IN MEDICAL RECORD: ICD-10-PCS | Mod: CPTII,SA,HB, | Performed by: NURSE PRACTITIONER

## 2023-03-23 PROCEDURE — 99215 PR OFFICE/OUTPT VISIT, EST, LEVL V, 40-54 MIN: ICD-10-PCS | Mod: S$PBB,SA,HB, | Performed by: NURSE PRACTITIONER

## 2023-03-23 PROCEDURE — 99213 OFFICE O/P EST LOW 20 MIN: CPT | Mod: PBBFAC,PN | Performed by: NURSE PRACTITIONER

## 2023-03-23 PROCEDURE — 99417 PR PROLONGED SVC, OUTPT, W/WO DIRECT PT CONTACT,  EA ADDTL 15 MIN: ICD-10-PCS | Mod: S$PBB,SA,HB, | Performed by: NURSE PRACTITIONER

## 2023-03-23 PROCEDURE — 3079F DIAST BP 80-89 MM HG: CPT | Mod: CPTII,SA,HB, | Performed by: NURSE PRACTITIONER

## 2023-03-23 PROCEDURE — 1159F MED LIST DOCD IN RCRD: CPT | Mod: CPTII,SA,HB, | Performed by: NURSE PRACTITIONER

## 2023-03-23 PROCEDURE — 99215 OFFICE O/P EST HI 40 MIN: CPT | Mod: S$PBB,SA,HB, | Performed by: NURSE PRACTITIONER

## 2023-03-23 PROCEDURE — 1160F RVW MEDS BY RX/DR IN RCRD: CPT | Mod: CPTII,SA,HB, | Performed by: NURSE PRACTITIONER

## 2023-03-23 PROCEDURE — 3075F PR MOST RECENT SYSTOLIC BLOOD PRESS GE 130-139MM HG: ICD-10-PCS | Mod: CPTII,SA,HB, | Performed by: NURSE PRACTITIONER

## 2023-03-23 RX ORDER — BUSPIRONE HYDROCHLORIDE 5 MG/1
TABLET ORAL
Qty: 90 TABLET | Refills: 3 | Status: SHIPPED | OUTPATIENT
Start: 2023-03-23 | End: 2023-05-08 | Stop reason: SDUPTHER

## 2023-03-23 RX ORDER — SERTRALINE HYDROCHLORIDE 50 MG/1
TABLET, FILM COATED ORAL
Qty: 60 TABLET | Refills: 3 | Status: SHIPPED | OUTPATIENT
Start: 2023-03-23 | End: 2023-05-08

## 2023-03-23 NOTE — PROGRESS NOTES
"Initial Interview  03/23/2023  HPI: Mamadou Isaacs is a 28 y.o. male here today for a psychiatric evaluation referred by PCP to the [facility] for   Past Medical History: ADHD, Anxiety, Depression, Drug abuse, GERD    Patient states, "I've been having some pretty bad anxiety." States that he has been to the ER twice in the past 6 months (November and December) for panic attacks.  States that his chest will get tight and he has palpitations; feels that he will black out.   States that the panic attacks started about 6-7 months ago and that he has about 4-5 panic attacks a week that vary in severity; at least one of them is bad. They occur at various times. The panic attacks happen in the afternoon; "133 to 1900 is prime time for panic attacks to happen."    States that he will take some of his mothers Klonopin during a panic attack. He will take Klonopin 1mg 2-3 times a week. States that this is the only medication that has been helpful.    His PCP started Buspar 5mg as needed but it was not helpful. States that one day, he took it three times and never felt any change. He did not use it after that.     He cannot give any reason or circumstance that may have caused his anxiety/panic.     He admits to having an addiction to Opiates when he was in high school.  States that he was using Oxycodone for about 2.5 years. At first it was recreation and then it increased until he was using daily. He was Rx'd Suboxone for close to 5 years. Finally got off Suboxone 2.5-3 years ago.     States that in the 7-8th grade and then 9th-10th grade, he was hanging out with the wrong crowd and was disrespectful towards his parents.     He has been sober for a while.   His parents are supportive while he is trying to stay sober and get on his feet and work through his panic attacks.     His panic attacks keep him from working.   He states that sometimes in the morning, he does not feel right; can feel the panic attack building. " "    He admits that he is anxious about "life happening so fast" and "responsibilities."    He is willing to try Zoloft 50mg q HS  and Buspar 5mg TID.  Follow-up in 4 weeks.    Encouraged 1:1 therapy    Medications:   Current Outpatient Medications   Medication Instructions    hydrocortisone 2.5 % ointment Topical (Top), 2 times daily    omeprazole (PRILOSEC) 40 mg, Oral, Every morning        Psychiatric History:   Reports a prior psychiatric history: ADHD  History of mental health out-patient treatment:   History of in-patient psychiatric hospitalization: denies  History of suicidal ideations:   History of suicidal threats:   History of suicide attempts: denies  History of self mutilation:     History of psychotropic medications:   Wellbutrin -  in the 4th grade  Adderall - took in middle and high school - stopped 2.5 years ago    Family Psychiatric History:  Mental Illness:   Alcohol abuse/addiction:   Drug addiction:     Substance Use History:  Clean from hard drugs for the last 7 years - Opiates; nothing else  Alcohol: every now and then; about 2 times a month  Marijuana: quit 2 years ago  Benzodiazepines: uses his mothers Klonopin  Opiates: Opiate addiction in the past; has not used in the last 2/5-3 years  Stimulants:  Cocaine: denies  Methamphetamine: denies  Nicotine: smokes 1/4 pack a day  Caffeine: one cup of coffee a day    Social History:   G[place] up in: [facility]  Raised by: parents  Number of siblings: one older brother  Education: HS grad; no college  Employment: cuts grass by himself  Marital Status: single  Children: none  Living situation: lives with his parents  Gnosticism affiliation: Judaism - not practicing    Trauma History:  History of Emotional/Mental abuse: denies  History of Physical abuse: denies  History of Sexual abuse: denies  History of other trauma: denies    Legal History:  Legal history: was on probation for 6months while in  for marijuana  Denies being on probation or " parole  Denies any upcoming court dates  Denies any pending charges.    PHQ Score:   03/23/2023: 3    SHI-7 Score:   03/23/2023: 13 moderate    Mental Status Evaluation:  Appearance:  casually dressed, neatly groomed   Behavior:  normal, cooperative   Speech:  no latency; no press   Mood:  anxious   Affect:  congruent and appropriate   Thought Process:  normal and logical   Thought Content:  normal, no suicidality, no homicidality, delusions, or paranoia   Sensorium:  grossly intact   Cognition:  grossly intact   Insight:  intact   Judgment:  behavior is adequate to circumstances     Impression:  Anxiety  2. Panic attacks    Plan:   Start Zoloft 50mg at night x 2weeks and then increase to 100mg at night if no adverse side effects  2. Start Buspar 5mg two or three times a day   3. Follow-up in 4 weeks

## 2023-03-23 NOTE — PATIENT INSTRUCTIONS
Start Zoloft 50mg at night x 2weeks and then increase to 100mg at night if no adverse side effects  2. Start Buspar 5mg two or three times a day   3. Follow-up in 4 weeks

## 2023-04-06 ENCOUNTER — HOSPITAL ENCOUNTER (OUTPATIENT)
Dept: CARDIOLOGY | Facility: HOSPITAL | Age: 28
Discharge: HOME | End: 2023-04-06
Attending: INTERNAL MEDICINE
Payer: MEDICAID

## 2023-04-06 VITALS
HEIGHT: 72 IN | SYSTOLIC BLOOD PRESSURE: 153 MMHG | BODY MASS INDEX: 31.15 KG/M2 | DIASTOLIC BLOOD PRESSURE: 96 MMHG | WEIGHT: 230 LBS

## 2023-04-06 DIAGNOSIS — R00.2 PALPITATIONS: ICD-10-CM

## 2023-04-06 DIAGNOSIS — R07.89 CHEST TIGHTNESS: ICD-10-CM

## 2023-04-06 LAB
AV INDEX (PROSTH): 0.76
AV MEAN GRADIENT: 4 MMHG
AV PEAK GRADIENT: 8 MMHG
AV VALVE AREA: 2.47 CM2
AV VELOCITY RATIO: 0.73
BSA FOR ECHO PROCEDURE: 2.3 M2
CV ECHO LV RWT: 0.33 CM
DOP CALC AO PEAK VEL: 1.39 M/S
DOP CALC AO VTI: 24.1 CM
DOP CALC LVOT AREA: 3.2 CM2
DOP CALC LVOT DIAMETER: 2.03 CM
DOP CALC LVOT PEAK VEL: 1.02 M/S
DOP CALC LVOT STROKE VOLUME: 59.52 CM3
DOP CALC MV VTI: 23.3 CM
DOP CALCLVOT PEAK VEL VTI: 18.4 CM
E WAVE DECELERATION TIME: 218.59 MSEC
E/A RATIO: 1.29
ECHO LV POSTERIOR WALL: 0.87 CM (ref 0.6–1.1)
EJECTION FRACTION: 55 %
FRACTIONAL SHORTENING: 30 % (ref 28–44)
HR MV ECHO: 83 BPM
INTERVENTRICULAR SEPTUM: 0.92 CM (ref 0.6–1.1)
LEFT ATRIUM SIZE: 3.34 CM
LEFT ATRIUM VOLUME INDEX MOD: 6.7 ML/M2
LEFT ATRIUM VOLUME MOD: 15.08 CM3
LEFT INTERNAL DIMENSION IN SYSTOLE: 3.65 CM (ref 2.1–4)
LEFT VENTRICLE DIASTOLIC VOLUME INDEX: 58.03 ML/M2
LEFT VENTRICLE DIASTOLIC VOLUME: 131.15 ML
LEFT VENTRICLE MASS INDEX: 75 G/M2
LEFT VENTRICLE SYSTOLIC VOLUME INDEX: 24.9 ML/M2
LEFT VENTRICLE SYSTOLIC VOLUME: 56.33 ML
LEFT VENTRICULAR INTERNAL DIMENSION IN DIASTOLE: 5.23 CM (ref 3.5–6)
LEFT VENTRICULAR MASS: 169.41 G
LV LATERAL E/E' RATIO: 4.41 M/S
LVOT MG: 2.43 MMHG
LVOT MV: 0.74 CM/S
MV MEAN GRADIENT: 2 MMHG
MV PEAK A VEL: 0.58 M/S
MV PEAK E VEL: 0.75 M/S
MV PEAK GRADIENT: 4 MMHG
MV VALVE AREA BY CONTINUITY EQUATION: 2.55 CM2
RA PRESSURE: 3 MMHG
RA WIDTH: 4.5 CM
TDI LATERAL: 0.17 M/S
TRICUSPID ANNULAR PLANE SYSTOLIC EXCURSION: 2.57 CM

## 2023-04-06 PROCEDURE — 93005 ELECTROCARDIOGRAM TRACING: CPT

## 2023-04-06 PROCEDURE — 93306 TTE W/DOPPLER COMPLETE: CPT

## 2023-04-10 ENCOUNTER — HOSPITAL ENCOUNTER (OUTPATIENT)
Dept: CARDIOLOGY | Facility: HOSPITAL | Age: 28
Discharge: HOME | End: 2023-04-10
Attending: INTERNAL MEDICINE
Payer: MEDICAID

## 2023-04-10 PROCEDURE — 93229 REMOTE 30 DAY ECG TECH SUPP: CPT

## 2023-04-12 ENCOUNTER — TELEPHONE (OUTPATIENT)
Dept: FAMILY MEDICINE | Facility: CLINIC | Age: 28
End: 2023-04-12
Payer: MEDICAID

## 2023-04-13 NOTE — TELEPHONE ENCOUNTER
Patient's mother called to check on results on testing that was done. Advised patient's mom to call Cardiology for results.

## 2023-04-13 NOTE — TELEPHONE ENCOUNTER
----- Message from Yancy Gama sent at 4/13/2023 10:17 AM CDT -----  Regarding: Referral  Patient calling to check the status of his Behavior Referral.  Please contact patient at [phone].  Thank You.

## 2023-04-18 ENCOUNTER — TELEPHONE (OUTPATIENT)
Dept: CARDIOLOGY | Facility: CLINIC | Age: 28
End: 2023-04-18
Payer: MEDICAID

## 2023-04-18 NOTE — TELEPHONE ENCOUNTER
Patient informed that his echo was normal.       ----- Message from Karina Youngblood MD sent at 4/14/2023  1:24 PM CDT -----  Regarding: RE: test results  Troy Méndez,    Can you please inform Mr. Moreira that his echo is normal?    Thanks!  Karina    ----- Message -----  From: Honey Mtz RN  Sent: 4/12/2023  10:10 AM CDT  To: Karina Youngblood MD  Subject: test results                                     Good morning!    Patient completed Echo on 4/6/23. Patient is requesting results. Please advise.     Rm,   Honey     ----- Message -----  From: Linda Nuñez  Sent: 4/12/2023   9:52 AM CDT  To: [facility] Cardiology Clinical Support Staff    Pt would like to know about echo  Thanks

## 2023-05-08 ENCOUNTER — OFFICE VISIT (OUTPATIENT)
Dept: BEHAVIORAL HEALTH | Facility: CLINIC | Age: 28
End: 2023-05-08
Payer: MEDICAID

## 2023-05-08 ENCOUNTER — PATIENT MESSAGE (OUTPATIENT)
Dept: BEHAVIORAL HEALTH | Facility: CLINIC | Age: 28
End: 2023-05-08
Payer: MEDICAID

## 2023-05-08 VITALS
BODY MASS INDEX: 31.59 KG/M2 | TEMPERATURE: 98 F | SYSTOLIC BLOOD PRESSURE: 133 MMHG | HEART RATE: 102 BPM | OXYGEN SATURATION: 100 % | WEIGHT: 232.88 LBS | DIASTOLIC BLOOD PRESSURE: 82 MMHG

## 2023-05-08 DIAGNOSIS — F41.0 PANIC ATTACKS: ICD-10-CM

## 2023-05-08 DIAGNOSIS — F41.0 PANIC DISORDER: Primary | ICD-10-CM

## 2023-05-08 LAB
AMPHET UR QL SCN: NEGATIVE
BARBITURATE SCN PRESENT UR: NEGATIVE
BENZODIAZ UR QL SCN: NEGATIVE
CANNABINOIDS UR QL SCN: NEGATIVE
COCAINE UR QL SCN: NEGATIVE
FENTANYL UR QL SCN: NEGATIVE
MDMA UR QL SCN: NEGATIVE
OPIATES UR QL SCN: NEGATIVE
PCP UR QL: NEGATIVE
PH UR: 7 [PH] (ref 3–11)

## 2023-05-08 PROCEDURE — 99215 PR OFFICE/OUTPT VISIT, EST, LEVL V, 40-54 MIN: ICD-10-PCS | Mod: S$PBB,AF,HB, | Performed by: STUDENT IN AN ORGANIZED HEALTH CARE EDUCATION/TRAINING PROGRAM

## 2023-05-08 PROCEDURE — 3079F PR MOST RECENT DIASTOLIC BLOOD PRESSURE 80-89 MM HG: ICD-10-PCS | Mod: CPTII,,, | Performed by: STUDENT IN AN ORGANIZED HEALTH CARE EDUCATION/TRAINING PROGRAM

## 2023-05-08 PROCEDURE — 3075F SYST BP GE 130 - 139MM HG: CPT | Mod: CPTII,,, | Performed by: STUDENT IN AN ORGANIZED HEALTH CARE EDUCATION/TRAINING PROGRAM

## 2023-05-08 PROCEDURE — 1159F PR MEDICATION LIST DOCUMENTED IN MEDICAL RECORD: ICD-10-PCS | Mod: CPTII,,, | Performed by: STUDENT IN AN ORGANIZED HEALTH CARE EDUCATION/TRAINING PROGRAM

## 2023-05-08 PROCEDURE — 3079F DIAST BP 80-89 MM HG: CPT | Mod: CPTII,,, | Performed by: STUDENT IN AN ORGANIZED HEALTH CARE EDUCATION/TRAINING PROGRAM

## 2023-05-08 PROCEDURE — 3008F BODY MASS INDEX DOCD: CPT | Mod: CPTII,,, | Performed by: STUDENT IN AN ORGANIZED HEALTH CARE EDUCATION/TRAINING PROGRAM

## 2023-05-08 PROCEDURE — 99213 OFFICE O/P EST LOW 20 MIN: CPT | Mod: PBBFAC,PN | Performed by: STUDENT IN AN ORGANIZED HEALTH CARE EDUCATION/TRAINING PROGRAM

## 2023-05-08 PROCEDURE — 80307 DRUG TEST PRSMV CHEM ANLYZR: CPT | Performed by: STUDENT IN AN ORGANIZED HEALTH CARE EDUCATION/TRAINING PROGRAM

## 2023-05-08 PROCEDURE — 1159F MED LIST DOCD IN RCRD: CPT | Mod: CPTII,,, | Performed by: STUDENT IN AN ORGANIZED HEALTH CARE EDUCATION/TRAINING PROGRAM

## 2023-05-08 PROCEDURE — 1160F PR REVIEW ALL MEDS BY PRESCRIBER/CLIN PHARMACIST DOCUMENTED: ICD-10-PCS | Mod: CPTII,,, | Performed by: STUDENT IN AN ORGANIZED HEALTH CARE EDUCATION/TRAINING PROGRAM

## 2023-05-08 PROCEDURE — 99215 OFFICE O/P EST HI 40 MIN: CPT | Mod: S$PBB,AF,HB, | Performed by: STUDENT IN AN ORGANIZED HEALTH CARE EDUCATION/TRAINING PROGRAM

## 2023-05-08 PROCEDURE — 1160F RVW MEDS BY RX/DR IN RCRD: CPT | Mod: CPTII,,, | Performed by: STUDENT IN AN ORGANIZED HEALTH CARE EDUCATION/TRAINING PROGRAM

## 2023-05-08 PROCEDURE — 3008F PR BODY MASS INDEX (BMI) DOCUMENTED: ICD-10-PCS | Mod: CPTII,,, | Performed by: STUDENT IN AN ORGANIZED HEALTH CARE EDUCATION/TRAINING PROGRAM

## 2023-05-08 PROCEDURE — 3075F PR MOST RECENT SYSTOLIC BLOOD PRESS GE 130-139MM HG: ICD-10-PCS | Mod: CPTII,,, | Performed by: STUDENT IN AN ORGANIZED HEALTH CARE EDUCATION/TRAINING PROGRAM

## 2023-05-08 RX ORDER — HYDROXYZINE PAMOATE 50 MG/1
50 CAPSULE ORAL 3 TIMES DAILY PRN
Qty: 90 CAPSULE | Refills: 5 | Status: SHIPPED | OUTPATIENT
Start: 2023-05-08 | End: 2023-06-19 | Stop reason: ALTCHOICE

## 2023-05-08 RX ORDER — BUSPIRONE HYDROCHLORIDE 10 MG/1
10 TABLET ORAL 2 TIMES DAILY
Qty: 60 TABLET | Refills: 5 | Status: SHIPPED | OUTPATIENT
Start: 2023-05-08 | End: 2023-06-19 | Stop reason: ALTCHOICE

## 2023-05-08 RX ORDER — BUSPIRONE HYDROCHLORIDE 10 MG/1
TABLET ORAL
Qty: 60 TABLET | Refills: 5 | Status: SHIPPED | OUTPATIENT
Start: 2023-05-08 | End: 2023-05-08 | Stop reason: SDUPTHER

## 2023-05-08 NOTE — PROGRESS NOTES
"Outpatient Psychiatry Initial Visit    5/8/2023    Mamadou Isaacs, a 28 y.o. male, presenting for initial evaluation visit. Met with patient.    Reason for Encounter:   Referred from: self  Reason for referral: "anxiety"  Chief complaint: anxiety and panic attacks x1 year    History of Present Illness:   Pt is a 29yo M w/ PPHx of anxiety  who presents to psychiatry clinic for evaluation.      Pt evaluated by psychiatric NP on 3/23/2023.  Diagnosed with anxiety and panic attacks.  Was started on zoloft and buspirone, took x 3.5 wks and then stopped.  Notes he was taking zoloft 50mg daily and took buspirone 5mg once daily.  Notes he was diagnosis of bipolar disorder in elementary school.  Was started on wellbutrin, can't remember response.  Changed to others medications, denies benefit.  Notes problems with substances in high school.  Notes he was prescribed medication for football injury, notes he gradually developed problem with oxycodone.  Says "it lasted until I graduated," 19yrs old.  Started on suboxone and took 4-5 yrs.  Had difficulty getting off suboxone, last took 3-4 yrs ago.  Has been taking klonopin from family members 12/2022-1/2023.  Taking 1mg tablets 3-4 days weekly.  Has been to hospital on numerous occasions for symptoms related to anxiety.  Notes only other substance use alcohol once weekly, 2-3x drinks per sitting and cigarette smoking 0.25ppd.      Endorses excess worry/anxiety.  Denies growing up with excessive anxiety, notes problems with anxiety starting 1 yr ago.  Worries about anxiety and having panic attacks.  Avoids potential triggers for panic attacks, spends 30 minutes daily.  Notes associated symptoms: denies rumination, denies sleep difficulty, some difficulty concentration, denies irritability, denies tension or feeling "on edge," + muscle tension (jaw and chest), + HA, + GI upset.  Notes panic attacks 1x daily.    Regarding depression, pt endorses history of depressive episodes.  " "Last depression episode [age]yo.   Denies history of suicidal thoughts, denies history of suicide attempts.      Denies history of episodes concerning for ladonna/hypomania.      Denies history of hallucinations or other altered perceptions, denies paranoid ideation.      Denies history of significant traumatic events.       Denies history of concussions or head injuries.       Meds Hx (has pt taken the following):   SSRIs: zoloft (no benefitk, notes SE depression)  SNRIs: denies  TCAs: denies  Atypical ADs: wellbutrin (SE "acting up in class")  Anxiolytics: buspirone (no benefit, no SE)  Neuroleptics: denies  Mood stabilizers: denies  Stimulants: denies  Other: denies    History:     Allergies:  Patient has no known allergies.    Past Medical/Surgical History:  Past Medical History:   Diagnosis Date    Anxiety     GERD (gastroesophageal reflux disease)      Past Surgical History:   Procedure Laterality Date    WISDOM TOOTH EXTRACTION         Medications  Outpatient Encounter Medications as of 5/8/2023   Medication Sig Dispense Refill    hydrocortisone 2.5 % ointment Apply topically 2 (two) times daily. for 7 days 28 g 1    omeprazole (PRILOSEC) 40 MG capsule Take 1 capsule (40 mg total) by mouth every morning. 30 capsule 3    busPIRone (BUSPAR) 10 MG tablet Take Buspar 5mg two or three times a day. 60 tablet 5    hydrOXYzine pamoate (VISTARIL) 50 MG Cap Take 1 capsule (50 mg total) by mouth 3 (three) times daily as needed (panic attacks). 90 capsule 5    [DISCONTINUED] busPIRone (BUSPAR) 5 MG Tab Take Buspar 5mg two or three times a day. (Patient not taking: Reported on 5/8/2023) 90 tablet 3    [DISCONTINUED] sertraline (ZOLOFT) 50 MG tablet Take Zoloft 50mg at bedtime x 2 weeks and then increase to 100mg at bedtime (Patient not taking: Reported on 5/8/2023) 60 tablet 3     No facility-administered encounter medications on file as of 5/8/2023.     Past Psychiatric History:  Previous Medication Trials: See above "   Previous Psychiatric Hospitalizations: denies   Previous Suicide Attempts: denies   History of Violence: None in past 6 months  Outpatient mental health: last in high school  Family History: denies    Social History:  Marital Status: not in dating relationship  Children: 0   Employment Status/Info: [employer]  Education: HS grad, no college  Housing Status: lives in house with mother and father  History of phys/sexual abuse: denies  Access to gun: yes, kept in unlocked closet, stored unloaded, ammunition     Substance Abuse History:  Tobacco Use: yes, 0.25ppd, started 21yo, wants to quit  Use of Alcohol: once weekly, 2 drinks per sitting, denies history of blackout, denies history of withdrawal  Recreational Drugs: see above  Rehab/detox: denies  MAT: yes, suboxone    Legal History:  Past Charges/Incarcerations: yes, cannabis possession, probation   Pending charges: denies     Psychosocial Stressors: health    Review Of Systems:     Constitutional: denies fevers, denies chills, denies recent weight change  Eyes: denies pain in eyes or loss of vision  Ears: denies tinnitis, denies loss of hearing  Mouth/throat: denies difficulty with speaking, denies difficulty with swallowing  Cardiac: denies CP, denies palpitations  Respiratory: denies SOB, denies cough  Gastrointestinal: denies abdominal pain, denies nausea/vomiting, denies constipation/diarrhea  Genitourinary: denies urinary frequency, denies burning on urination  Dermatologic: denies rash, denies erythema  Musculoskeletal: denies myalgias, denies arthralgias  Hematologic: denies easy bleeding/bruising, denies enlarged lymph nodes  Neurologic: denies seizures, denies headaches, denies loss of sensation, denies weakness  Psychiatric: see HPI    Current Evaluation:     Nutritional Screening: Considering the patient's height and weight, medications, medical history and preferences, should a referral be made to the dietitian? no    Constitutional  Vitals:  " Most recent vital signs, dated less than 90 days prior to this appointment, were reviewed.      Vitals:    05/08/23 0901   BP: 133/82   Pulse: 102   Temp: 98.2 °F (36.8 °C)   SpO2: 100%   Weight: 105.6 kg (232 lb 14.4 oz)      General:  No acute distress     Neurologic:   Motor: moves all extremities spontaneously and without difficulty  Gait: normal gait and station    Mental status examination:  Appearance: unremarkable, age appropriate  Level of Consciousness: awake and alert  Behavior/Cooperation: calm and cooperative  Psychomotor: unremarkable  Speech: normal tone, normal rate, normal pitch, normal volume  Language: english, fluid  Orientation: grossly intact, person, place, situation, day of week, month of year, year  Attention Span/Concentration: intact to interview and spells "WORLD" forwards and backwards without error  Memory: Registers 3/3 objects, recalls 3/3 objects at 5 minutes without cuing  Mood: "on edge"  Affect: mood congruent, constricted, anxious appearing  Thought Process: linear, goal-directed  Associations: Logical and appropriate  Thought Content: denies SI/HI/paranoia, no delusional ideation volunteered, denies plan or desire for self harm or harm to others  Fund of Knowledge: appropriate for education  Abstraction: proverbs were abstract and similarities were abstract  Insight: fair  Judgment: good    Relevant Elements of Neurological Exam: no abnormal involuntary movements observed    Functioning in Relationships:  Spouse/partner: not in dating relationshiop  Peers: good  Employers: good    Assessments:   PHQ9:   PHQ9 5/8/2023   Total Score 3     GAD7:   GAD7 5/8/2023 3/23/2023 1/26/2023   1. Feeling nervous, anxious, or on edge? 3 3 2   2. Not being able to stop or control worrying? 1 2 1   3. Worrying too much about different things? 1 2 0   4. Trouble relaxing? 3 3 0   5. Being so restless that it is hard to sit still? 0 0 0   6. Becoming easily annoyed or irritable? 0 0 0   7. " Feeling afraid as if something awful might happen? 3 3 1   8. If you checked off any problems, how difficult have these problems made it for you to do your work, take care of things at home, or get along with other people? 2 2 -   SHI-7 Score 11 13 4     Laboratory Data  No visits with results within 1 Month(s) from this visit.   Latest known visit with results is:   Hospital Outpatient Visit on 04/06/2023   Component Date Value Ref Range Status    BSA 04/06/2023 2.3  m2 Final    LV LATERAL E/E' RATIO 04/06/2023 4.41  m/s Final    EF 04/06/2023 55  % Final    Left Ventricular Outflow Tract Katerin* 04/06/2023 0.74  cm/s Final    Left Ventricular Outflow Tract Katerin* 04/06/2023 2.43  mmHg Final    TDI LATERAL 04/06/2023 0.17  m/s Final    LVIDd 04/06/2023 5.23  3.5 - 6.0 cm Final    IVS 04/06/2023 0.92  0.6 - 1.1 cm Final    Posterior Wall 04/06/2023 0.87  0.6 - 1.1 cm Final    LVIDs 04/06/2023 3.65  2.1 - 4.0 cm Final    FS 04/06/2023 30  28 - 44 % Final    LV mass 04/06/2023 169.41  g Final    LA size 04/06/2023 3.34  cm Final    TAPSE 04/06/2023 2.57  cm Final    Left Ventricle Relative Wall Thick* 04/06/2023 0.33  cm Final    AV mean gradient 04/06/2023 4  mmHg Final    AV valve area 04/06/2023 2.47  cm2 Final    AV Velocity Ratio 04/06/2023 0.73   Final    AV index (prosthetic) 04/06/2023 0.76   Final    MV mean gradient 04/06/2023 2  mmHg Final    MV valve area by continuity eq 04/06/2023 2.55  cm2 Final    E/A ratio 04/06/2023 1.29   Final    E wave deceleration time 04/06/2023 218.59  msec Final    LVOT diameter 04/06/2023 2.03  cm Final    LVOT area 04/06/2023 3.2  cm2 Final    LVOT peak tiff 04/06/2023 1.02  m/s Final    LVOT peak VTI 04/06/2023 18.40  cm Final    Ao peak tiff 04/06/2023 1.39  m/s Final    Ao VTI 04/06/2023 24.1  cm Final    LVOT stroke volume 04/06/2023 59.52  cm3 Final    AV peak gradient 04/06/2023 8  mmHg Final    MV peak gradient 04/06/2023 4  mmHg Final    MV Peak E Tiff 04/06/2023 0.75  m/s  Final    MV VTI 04/06/2023 23.3  cm Final    MV Peak A Andrew 04/06/2023 0.58  m/s Final    LV Systolic Volume 04/06/2023 56.33  mL Final    LV Systolic Volume Index 04/06/2023 24.9  mL/m2 Final    LV Diastolic Volume 04/06/2023 131.15  mL Final    LV Diastolic Volume Index 04/06/2023 58.03  mL/m2 Final    LV Mass Index 04/06/2023 75  g/m2 Final    LA Volume Index (Mod) 04/06/2023 6.7  mL/m2 Final    Mitral Valve Heart Rate 04/06/2023 83  bpm Final    LA volume (mod) 04/06/2023 15.08  cm3 Final    RA Width 04/06/2023 4.50  cm Final    Right Atrial Pressure (from IVC) 04/06/2023 3  mmHg Final     Assessment - Diagnosis - Goals:     Mamadou Isaacs, a 28 y.o. male, presenting for initial evaluation visit.     Impression:       ICD-10-CM ICD-9-CM   1. Panic disorder  F41.0 300.01   2. Panic attacks  F41.0 300.01     Strengths and Liabilities: Strength: Patient accepts guidance/feedback, Strength: Patient is expressive/articulate., Liability: Patient is impulsive., Liability: Patient lacks coping skills.    Treatment Goals:  Specify outcomes written in observable, behavioral terms:   Anxiety: reducing physical symptoms of anxiety and reducing time spent worrying (<30 minutes/day)    Treatment Plan/Recommendations:   Restart buspirone at 10mg bid   Start hydroxyzine 50mg tid prn anxiety, discussed option to take 2 capsules at once if needed, discussed potential SE including but not limited to sedation, falls, constipation, urinary retention, dry mouth  Recommend pt establish with a psychotherapist/counselor, pt not interested at this time  Recent labwork in EMR reviewed, CBC w/ elevated WBC, CMP wnl, TSH wnl  Ordered UDS  No need for PEC as pt is not an imminent danger to self or others or gravely disabled due to acute psychiatric illness  Discussed that pt should either call clinic for psychiatric crisis symptoms or present to nearest emergency room    Discussed with patient informed consent including diagnosis, risks and  benefits of proposed treatment above vs. alternative treatments vs. no treatment, as well as serious and common side effects of these treatments, and the inherent unpredictability of individual responses to these treatments. The patient expresses understanding of the above and displays the capacity to agree with this current plan. Patient also agrees that, currently, the benefits outweigh the risks and would like to pursue treatment at this time, and had no other questions.    Instructions:  Take all medications as prescribed.    Abstain from recreational drugs and alcohol.  Present to ED or call 911 for SI/HI plan or intent, psychosis, or medical emergency.    Return to Clinic: Follow up in about 2 years (around 5/8/2025) for Virtual Visit.    Total time:   Complexity (level) of medical decision making employed in the encounter: HIGH    The total time for services performed on the date of the encounter (including review of prior visit notes, review of notes from other providers, review of results from laboratory/imaging studies, face-to-face time with patient, and time spent on other activities directly related to patient care): 55 minutes.    Rickie Oviedo MD  [facility]

## 2023-05-22 ENCOUNTER — OFFICE VISIT (OUTPATIENT)
Dept: BEHAVIORAL HEALTH | Facility: CLINIC | Age: 28
End: 2023-05-22
Payer: MEDICAID

## 2023-05-22 DIAGNOSIS — F41.0 PANIC ATTACKS: ICD-10-CM

## 2023-05-22 DIAGNOSIS — F41.0 PANIC DISORDER: Primary | ICD-10-CM

## 2023-05-22 PROCEDURE — 1159F MED LIST DOCD IN RCRD: CPT | Mod: CPTII,95,, | Performed by: STUDENT IN AN ORGANIZED HEALTH CARE EDUCATION/TRAINING PROGRAM

## 2023-05-22 PROCEDURE — 1160F PR REVIEW ALL MEDS BY PRESCRIBER/CLIN PHARMACIST DOCUMENTED: ICD-10-PCS | Mod: CPTII,95,, | Performed by: STUDENT IN AN ORGANIZED HEALTH CARE EDUCATION/TRAINING PROGRAM

## 2023-05-22 PROCEDURE — 99213 OFFICE O/P EST LOW 20 MIN: CPT | Mod: 95,AF,HB, | Performed by: STUDENT IN AN ORGANIZED HEALTH CARE EDUCATION/TRAINING PROGRAM

## 2023-05-22 PROCEDURE — 1159F PR MEDICATION LIST DOCUMENTED IN MEDICAL RECORD: ICD-10-PCS | Mod: CPTII,95,, | Performed by: STUDENT IN AN ORGANIZED HEALTH CARE EDUCATION/TRAINING PROGRAM

## 2023-05-22 PROCEDURE — 99213 PR OFFICE/OUTPT VISIT, EST, LEVL III, 20-29 MIN: ICD-10-PCS | Mod: 95,AF,HB, | Performed by: STUDENT IN AN ORGANIZED HEALTH CARE EDUCATION/TRAINING PROGRAM

## 2023-05-22 PROCEDURE — 1160F RVW MEDS BY RX/DR IN RCRD: CPT | Mod: CPTII,95,, | Performed by: STUDENT IN AN ORGANIZED HEALTH CARE EDUCATION/TRAINING PROGRAM

## 2023-05-22 NOTE — PROGRESS NOTES
"Outpatient Psychiatry Follow-Up Visit    5/22/2023    Clinical Status of Patient:  Outpatient (Ambulatory)    Chief Complaint:  Mamadou Isaacs is a 28 y.o. male who presents today for follow-up of anxiety and panic attacks . Patient last seen for initial evaluation on 5/8/2023. Met with patient.      TELE PSYCHIATRY Disclaimer   *The patient was informed despite using HIPPA compliant technology there may be risks including security breach, technological failure, inability to perform a comprehensive physical exam which could delay or prevent an accurate diagnosis, and potential complications from treatment decisions rendered over a telemedical platform.   The patient was also informed of the relationship between the physician and patient and the respective role of any other health care provider with respect to management of the patient; and notified that the pt may decline to receive medical services by telemedicine and may withdraw from such care at any time.     Patient's Current location: [address] [place] [address]    In Case of Emergency pts next of kin  Name:Laureen Isaacs (mother)   Phone number:  [phone]  Visit type: Audio only  Total time spent with patient: 18 minutes    Interval History and Content of Current Session:  Interim Events/Subjective Report/Content of Current Session:   **Audio only visit due to failure of AV telemedicine system.**  Pt reports "I've still been struggling"  overall.  Reports "good" mood, elevated anxiety, notes continued panic attacks (1-2x daily).  Sleeping "decent."  Appetite "the same," weight stable.  Energy low, motivation poor.  Denies irritability, denies hopelessness.  Denies SI/HI/AVH/paranoia, denies plan or desire for self harm or harm to others.  Denies SE from current regimen Reports somatic complaints of chest pain with panic attacks. Pt voices desire to adjust regimen to address ongoing symptoms.      Psychiatric Review of Systems-is patient " "experiencing or having changes in  Anxiety: elevated, ongoing panic attacks  Sleep: "decent"  Appetite: "the same"  Weight: stable  Energy: low  Concentration: poor   Libido: no problem voiced  Irritability: denies  Motivation: poor  Guilt/hopelessness: denies  Paranoia/delusions: denies  SIB/risky behavior: [id number]    Review of Systems   PSYCHIATRIC: Pertinant items are noted in the narrative.  CONSTITUTIONAL: No weight gain or loss.  MUSCULOSKELETAL: No pain or stiffness of the joints.  NEUROLOGIC: No weakness, sensory changes, seizures, confusion, memory loss, tremor or other abnormal movements.  CARDIAC: No CP, no palpitations  RESPIRATORY: No shortness of breath.  CARDIOVASCULAR: No tachycardia or chest pain.  GASTROINTESTINAL: No nausea, vomiting, pain, constipation or diarrhea.    Past Medical, Family and Social History: The patient's past medical, family and social history have been reviewed and updated as appropriate within the electronic medical record - see encounter notes.    Compliance: good    Side effects: denies    Risk Parameters:  Patient reports no suicidal ideation  Patient reports no homicidal ideation  Patient reports no self-injurious behavior  Patient reports no violent behavior    Exam (detailed: at least 9 elements; comprehensive: all 15 elements)   Constitutional  Vitals:  Most recent vital signs, dated less than 90 days prior to this appointment, were reviewed.     There were no vitals filed for this visit.       General:   Constitutional: ISHMAEL    Neurologic:   Motor: ISHMAEL  Gait: ISHMAEL    Mental status examination:   Appearance: ISHMAEL  Behavior: calm and cooperative  Mood: "ok"  Affect: ISHMAEL  Thought process: linear and goal directed  Associations: appropriate for conversation  Thought content: no plan or desire for self harm or harm to others, denies paranoia, no delusional ideation volunteered  Perceptions: denies hallucinations or other altered perceptions  Orientation: oriented to day of " week, month, year, location, and situation  Language: English, fluid  Attention: able to attend to interview  Insight: good  Judgement: good    PHQ9 5/8/2023   Total Score 3       GAD7 5/8/2023 3/23/2023 1/26/2023   1. Feeling nervous, anxious, or on edge? 3 3 2   2. Not being able to stop or control worrying? 1 2 1   3. Worrying too much about different things? 1 2 0   4. Trouble relaxing? 3 3 0   5. Being so restless that it is hard to sit still? 0 0 0   6. Becoming easily annoyed or irritable? 0 0 0   7. Feeling afraid as if something awful might happen? 3 3 1   8. If you checked off any problems, how difficult have these problems made it for you to do your work, take care of things at home, or get along with other people? 2 2 -   SHI-7 Score 11 13 4       Assessment and Diagnosis   Status/Progress: Based on the examination today, the patient's problem(s) is/are inadequately controlled.  New problems have not been presented today.   Co-morbidities and Lack of compliance are not complicating management of the primary condition.  Number of separate conditions addressed during today's visit: 2 (panic disorder uncontrolled, panic attacks unchanged).  Are medication adjustments being made today: Yes.  Are referral(s) being ordered today: No.  Complexity (level) of medical decision making employed in the encounter: MODERATE.    General Impression:    ICD-10-CM ICD-9-CM   1. Panic disorder  F41.0 300.01   2. Panic attacks  F41.0 300.01     Intervention/Counseling/Treatment Plan   Increase buspirone to 15mg bid, gave option to increase further to 15mg tid if needed   Continue hydroxyzine 50mg tid prn anxiety, discussed option to take 2 capsules at once if needed  Recommend pt establish with a psychotherapist/counselor, pt not interested at this time  Recent labwork in EMR reviewed, CBC w/ elevated WBC, CMP wnl, TSH wnl  UDS negative at initial visit  No need for PEC as pt is not an imminent danger to self or others or  gravely disabled due to acute psychiatric illness  Discussed that pt should either call clinic for psychiatric crisis symptoms or present to nearest emergency room    Discussed with patient informed consent including diagnosis, risks and benefits of proposed treatment above vs. alternative treatments vs. no treatment, as well as serious and common side effects of these treatments, and the inherent unpredictability of individual responses to these treatments. The patient expresses understanding of the above and displays the capacity to agree with this current plan. Patient also agrees that, currently, the benefits outweigh the risks and would like to pursue treatment at this time, and had no other questions.    Instructions:  Take all medications as prescribed.    Abstain from recreational drugs and alcohol.  Present to ED or call 911 for SI/HI plan or intent, psychosis, or medical emergency.    Return to Clinic: Follow up in about 4 weeks (around 6/19/2023).    Total time:   The total time for services performed on the date of the encounter (including review of prior visit notes, review of notes from other providers, review of results from laboratory/imaging studies, face-to-face time with patient, and time spent on other activities directly related to patient care): 18 minutes.    Rickie Oviedo MD  [facility]

## 2023-06-12 PROBLEM — Z13.6 ENCOUNTER FOR LIPID SCREENING FOR CARDIOVASCULAR DISEASE: Status: RESOLVED | Noted: 2023-03-08 | Resolved: 2023-06-12

## 2023-06-12 PROBLEM — Z00.00 ANNUAL PHYSICAL EXAM: Status: RESOLVED | Noted: 2023-03-08 | Resolved: 2023-06-12

## 2023-06-12 PROBLEM — Z13.220 ENCOUNTER FOR LIPID SCREENING FOR CARDIOVASCULAR DISEASE: Status: RESOLVED | Noted: 2023-03-08 | Resolved: 2023-06-12

## 2023-06-19 ENCOUNTER — OFFICE VISIT (OUTPATIENT)
Dept: BEHAVIORAL HEALTH | Facility: CLINIC | Age: 28
End: 2023-06-19
Payer: MEDICAID

## 2023-06-19 VITALS
HEART RATE: 96 BPM | DIASTOLIC BLOOD PRESSURE: 90 MMHG | WEIGHT: 228.31 LBS | SYSTOLIC BLOOD PRESSURE: 145 MMHG | TEMPERATURE: 98 F | OXYGEN SATURATION: 100 % | BODY MASS INDEX: 30.96 KG/M2

## 2023-06-19 DIAGNOSIS — F41.0 PANIC DISORDER: Primary | ICD-10-CM

## 2023-06-19 DIAGNOSIS — F41.0 PANIC ATTACKS: ICD-10-CM

## 2023-06-19 PROCEDURE — 99213 OFFICE O/P EST LOW 20 MIN: CPT | Mod: PBBFAC,PN | Performed by: STUDENT IN AN ORGANIZED HEALTH CARE EDUCATION/TRAINING PROGRAM

## 2023-06-19 PROCEDURE — 3008F BODY MASS INDEX DOCD: CPT | Mod: CPTII,,, | Performed by: STUDENT IN AN ORGANIZED HEALTH CARE EDUCATION/TRAINING PROGRAM

## 2023-06-19 PROCEDURE — 3080F PR MOST RECENT DIASTOLIC BLOOD PRESSURE >= 90 MM HG: ICD-10-PCS | Mod: CPTII,,, | Performed by: STUDENT IN AN ORGANIZED HEALTH CARE EDUCATION/TRAINING PROGRAM

## 2023-06-19 PROCEDURE — 3080F DIAST BP >= 90 MM HG: CPT | Mod: CPTII,,, | Performed by: STUDENT IN AN ORGANIZED HEALTH CARE EDUCATION/TRAINING PROGRAM

## 2023-06-19 PROCEDURE — 1160F PR REVIEW ALL MEDS BY PRESCRIBER/CLIN PHARMACIST DOCUMENTED: ICD-10-PCS | Mod: CPTII,,, | Performed by: STUDENT IN AN ORGANIZED HEALTH CARE EDUCATION/TRAINING PROGRAM

## 2023-06-19 PROCEDURE — 99214 OFFICE O/P EST MOD 30 MIN: CPT | Mod: AF,HB,S$PBB, | Performed by: STUDENT IN AN ORGANIZED HEALTH CARE EDUCATION/TRAINING PROGRAM

## 2023-06-19 PROCEDURE — 1159F PR MEDICATION LIST DOCUMENTED IN MEDICAL RECORD: ICD-10-PCS | Mod: CPTII,,, | Performed by: STUDENT IN AN ORGANIZED HEALTH CARE EDUCATION/TRAINING PROGRAM

## 2023-06-19 PROCEDURE — 1160F RVW MEDS BY RX/DR IN RCRD: CPT | Mod: CPTII,,, | Performed by: STUDENT IN AN ORGANIZED HEALTH CARE EDUCATION/TRAINING PROGRAM

## 2023-06-19 PROCEDURE — 3008F PR BODY MASS INDEX (BMI) DOCUMENTED: ICD-10-PCS | Mod: CPTII,,, | Performed by: STUDENT IN AN ORGANIZED HEALTH CARE EDUCATION/TRAINING PROGRAM

## 2023-06-19 PROCEDURE — 99214 PR OFFICE/OUTPT VISIT, EST, LEVL IV, 30-39 MIN: ICD-10-PCS | Mod: AF,HB,S$PBB, | Performed by: STUDENT IN AN ORGANIZED HEALTH CARE EDUCATION/TRAINING PROGRAM

## 2023-06-19 PROCEDURE — 3077F SYST BP >= 140 MM HG: CPT | Mod: CPTII,,, | Performed by: STUDENT IN AN ORGANIZED HEALTH CARE EDUCATION/TRAINING PROGRAM

## 2023-06-19 PROCEDURE — 1159F MED LIST DOCD IN RCRD: CPT | Mod: CPTII,,, | Performed by: STUDENT IN AN ORGANIZED HEALTH CARE EDUCATION/TRAINING PROGRAM

## 2023-06-19 PROCEDURE — 3077F PR MOST RECENT SYSTOLIC BLOOD PRESSURE >= 140 MM HG: ICD-10-PCS | Mod: CPTII,,, | Performed by: STUDENT IN AN ORGANIZED HEALTH CARE EDUCATION/TRAINING PROGRAM

## 2023-06-19 RX ORDER — CLONAZEPAM 0.5 MG/1
0.5 TABLET ORAL DAILY PRN
Qty: 15 TABLET | Refills: 0 | Status: SHIPPED | OUTPATIENT
Start: 2023-06-19 | End: 2023-06-26 | Stop reason: SDUPTHER

## 2023-06-19 NOTE — PROGRESS NOTES
"Outpatient Psychiatry Follow-Up Visit    6/19/2023    Clinical Status of Patient:  Outpatient (Ambulatory)    Chief Complaint:  Mamadou Isaacs is a 28 y.o. male who presents today for follow-up of anxiety and panic attacks . Patient last seen for follow-up on 5/22/2023. Met with patient and pt's mother.      Interval History and Content of Current Session:  Interim Events/Subjective Report/Content of Current Session:   Pt reports doing "not good"  overall.  Denies any benefit from buspirone and hydroxyzine.   Reports down mood, Denies feeling depressed, elevated anxiety, having panic attacks 2-3x daily, attack lasts 1-2 hrs.  Sleep "alright." Appetite stable, weight stable.  Energy low, motivation good.  Denies irritability, denies hopelessness.  Denies SI/HI/AVH/paranoia, denies plan or desire for self harm or harm to others.  Denies SE from current regimen. Reports somatic complaints of chest pain and neck pain with panic attacks. Pt voices desire to adjust regimen to address ongoing symptoms.  Pt's mother confirms that pt has been struggling with anxiety, notes that she has history of similar symptoms.  She advocates for change to regimen as well.     Psychiatric Review of Systems-is patient experiencing or having changes in  Anxiety: elevated, ongoing panic attacks  Sleep: unchanged  Appetite: stable  Weight: stable  Energy: low  Concentration: poor   Libido: no problem voiced  Irritability: denies  Motivation: poor  Guilt/hopelessness: denies  Paranoia/delusions: denies  SIB/risky behavior: [id number]    Review of Systems   PSYCHIATRIC: Pertinant items are noted in the narrative.  CONSTITUTIONAL: No weight gain or loss.  MUSCULOSKELETAL: No pain or stiffness of the joints.  NEUROLOGIC: No weakness, sensory changes, seizures, confusion, memory loss, tremor or other abnormal movements.  CARDIAC: No CP, no palpitations  RESPIRATORY: No shortness of breath.  CARDIOVASCULAR: No tachycardia or chest " "pain.  GASTROINTESTINAL: No nausea, vomiting, pain, constipation or diarrhea.    Past Medical, Family and Social History: The patient's past medical, family and social history have been reviewed and updated as appropriate within the electronic medical record - see encounter notes.    Compliance: good    Side effects: denies    Risk Parameters:  Patient reports no suicidal ideation  Patient reports no homicidal ideation  Patient reports no self-injurious behavior  Patient reports no violent behavior    Exam (detailed: at least 9 elements; comprehensive: all 15 elements)   Constitutional  Vitals:  Most recent vital signs, dated less than 90 days prior to this appointment, were reviewed.     Vitals:    06/19/23 1121   BP: (!) 145/90   Pulse: 96   Temp: 98.3 °F (36.8 °C)   SpO2: 100%   Weight: 103.6 kg (228 lb 4.8 oz)        General:   Constitutional: appears stated age, no acute distress    Neurologic:   Motor: Moves all extremities spontaneously, no abnormal involuntary movements observed  Gait: normal gait and station    Mental status examination:   Appearance: appears stated age, casually dressed  Behavior: anxious appearing, cooperative  Mood: "anxious"  Affect: anxious appearing, constricted  Thought process: linear and goal directed  Associations: appropriate for conversation  Thought content: no plan or desire for self harm or harm to others, denies paranoia, no delusional ideation volunteered  Perceptions: denies hallucinations or other altered perceptions  Orientation: oriented to day of week, month, year, location, and situation  Language: English, fluid  Attention: able to attend to interview  Insight: good  Judgement: good    PHQ9 6/19/2023   Total Score 9     GAD7 6/19/2023 5/8/2023 3/23/2023   1. Feeling nervous, anxious, or on edge? 3 3 3   2. Not being able to stop or control worrying? 3 1 2   3. Worrying too much about different things? 3 1 2   4. Trouble relaxing? 3 3 3   5. Being so restless that it " is hard to sit still? 3 0 0   6. Becoming easily annoyed or irritable? 1 0 0   7. Feeling afraid as if something awful might happen? 3 3 3   8. If you checked off any problems, how difficult have these problems made it for you to do your work, take care of things at home, or get along with other people? 3 2 2   SHI-7 Score 19 11 13     Assessment and Diagnosis   Status/Progress: Based on the examination today, the patient's problem(s) is/are inadequately controlled.  New problems have not been presented today.   Co-morbidities and Lack of compliance are not complicating management of the primary condition.  Number of separate conditions addressed during today's visit: 2 (panic disorder uncontrolled, panic attacks uncontrolled).  Are medication adjustments being made today: Yes.  Are referral(s) being ordered today: No.  Complexity (level) of medical decision making employed in the encounter: MODERATE.    General Impression:    ICD-10-CM ICD-9-CM   1. Panic disorder  F41.0 300.01   2. Panic attacks  F41.0 300.01     Intervention/Counseling/Treatment Plan   Discontinue buspirone due to lack of benefit  Discontinue hydroxyzine due to lack of benefit  Start klonopin 0.5mg daily prn, will give #15, PDMP reviewed and demonstrated no abnormal filling patterns, pt to call in 1-2 wks to report response to medication trial, Discussed potential SE including but not limited to addictive behavior, cognitive clouding, sedation, falls, memory impairment; discussed risks of life-threatening oversedation if taken with sedating substances (including alcohol, prescription medications, recreational substances, etc.)  Consider addition of lexapro vs other for maintenance treatment of anxiety, discussed that benzodiazepines are not preferred long term treatment for anxiety symptoms  Recommend pt establish with a psychotherapist/counselor, pt not interested at this time  Recent labwork in EMR reviewed, CBC w/ elevated WBC, CMP wnl, TSH  wnl  UDS negative at initial visit  No need for PEC as pt is not an imminent danger to self or others or gravely disabled due to acute psychiatric illness  Discussed that pt should either call clinic for psychiatric crisis symptoms or present to nearest emergency room    Discussed with patient informed consent including diagnosis, risks and benefits of proposed treatment above vs. alternative treatments vs. no treatment, as well as serious and common side effects of these treatments, and the inherent unpredictability of individual responses to these treatments. The patient expresses understanding of the above and displays the capacity to agree with this current plan. Patient also agrees that, currently, the benefits outweigh the risks and would like to pursue treatment at this time, and had no other questions.    Instructions:  Take all medications as prescribed.    Abstain from recreational drugs and alcohol.  Present to ED or call 911 for SI/HI plan or intent, psychosis, or medical emergency.    Return to Clinic: Follow up in about 4 weeks (around 7/17/2023).    Total time:   The total time for services performed on the date of the encounter (including review of prior visit notes, review of notes from other providers, review of results from laboratory/imaging studies, face-to-face time with patient, and time spent on other activities directly related to patient care): 25 minutes.    Rickie Oviedo MD  [facility]

## 2023-06-23 ENCOUNTER — TELEPHONE (OUTPATIENT)
Dept: FAMILY MEDICINE | Facility: CLINIC | Age: 28
End: 2023-06-23
Payer: MEDICAID

## 2023-06-23 DIAGNOSIS — F41.0 PANIC DISORDER: ICD-10-CM

## 2023-06-26 RX ORDER — CLONAZEPAM 1 MG/1
1 TABLET ORAL DAILY PRN
Qty: 15 TABLET | Refills: 0 | Status: SHIPPED | OUTPATIENT
Start: 2023-06-26 | End: 2023-07-10 | Stop reason: SDUPTHER

## 2023-06-26 NOTE — TELEPHONE ENCOUNTER
Pt requesting a higher dose. States that medication is helping a little but he is still having persistent anxiety throughout the day

## 2023-06-26 NOTE — TELEPHONE ENCOUNTER
Pt called to request higher dose of clonazepam.  PDMP reviewed and demonstrated no abnormal filling patterns. Will send in Rx for klonopin 1mg tablets.

## 2023-06-27 ENCOUNTER — OFFICE VISIT (OUTPATIENT)
Dept: FAMILY MEDICINE | Facility: CLINIC | Age: 28
End: 2023-06-27
Payer: MEDICAID

## 2023-06-27 VITALS
BODY MASS INDEX: 31.29 KG/M2 | SYSTOLIC BLOOD PRESSURE: 123 MMHG | OXYGEN SATURATION: 99 % | TEMPERATURE: 98 F | HEIGHT: 72 IN | DIASTOLIC BLOOD PRESSURE: 83 MMHG | WEIGHT: 231 LBS | HEART RATE: 93 BPM | RESPIRATION RATE: 18 BRPM

## 2023-06-27 DIAGNOSIS — R21 RASH: ICD-10-CM

## 2023-06-27 DIAGNOSIS — G44.209 TENSION HEADACHE: ICD-10-CM

## 2023-06-27 DIAGNOSIS — H53.8 BLURRY VISION, LEFT EYE: Primary | ICD-10-CM

## 2023-06-27 PROCEDURE — 1159F PR MEDICATION LIST DOCUMENTED IN MEDICAL RECORD: ICD-10-PCS | Mod: CPTII,,, | Performed by: NURSE PRACTITIONER

## 2023-06-27 PROCEDURE — 1160F RVW MEDS BY RX/DR IN RCRD: CPT | Mod: CPTII,,, | Performed by: NURSE PRACTITIONER

## 2023-06-27 PROCEDURE — 3008F BODY MASS INDEX DOCD: CPT | Mod: CPTII,,, | Performed by: NURSE PRACTITIONER

## 2023-06-27 PROCEDURE — 3079F DIAST BP 80-89 MM HG: CPT | Mod: CPTII,,, | Performed by: NURSE PRACTITIONER

## 2023-06-27 PROCEDURE — 1160F PR REVIEW ALL MEDS BY PRESCRIBER/CLIN PHARMACIST DOCUMENTED: ICD-10-PCS | Mod: CPTII,,, | Performed by: NURSE PRACTITIONER

## 2023-06-27 PROCEDURE — 99215 OFFICE O/P EST HI 40 MIN: CPT | Mod: PBBFAC | Performed by: NURSE PRACTITIONER

## 2023-06-27 PROCEDURE — 99214 OFFICE O/P EST MOD 30 MIN: CPT | Mod: S$PBB,,, | Performed by: NURSE PRACTITIONER

## 2023-06-27 PROCEDURE — 1159F MED LIST DOCD IN RCRD: CPT | Mod: CPTII,,, | Performed by: NURSE PRACTITIONER

## 2023-06-27 PROCEDURE — 3079F PR MOST RECENT DIASTOLIC BLOOD PRESSURE 80-89 MM HG: ICD-10-PCS | Mod: CPTII,,, | Performed by: NURSE PRACTITIONER

## 2023-06-27 PROCEDURE — 3008F PR BODY MASS INDEX (BMI) DOCUMENTED: ICD-10-PCS | Mod: CPTII,,, | Performed by: NURSE PRACTITIONER

## 2023-06-27 PROCEDURE — 3074F SYST BP LT 130 MM HG: CPT | Mod: CPTII,,, | Performed by: NURSE PRACTITIONER

## 2023-06-27 PROCEDURE — 3074F PR MOST RECENT SYSTOLIC BLOOD PRESSURE < 130 MM HG: ICD-10-PCS | Mod: CPTII,,, | Performed by: NURSE PRACTITIONER

## 2023-06-27 PROCEDURE — 99214 PR OFFICE/OUTPT VISIT, EST, LEVL IV, 30-39 MIN: ICD-10-PCS | Mod: S$PBB,,, | Performed by: NURSE PRACTITIONER

## 2023-06-27 RX ORDER — DOXYCYCLINE 100 MG/1
100 CAPSULE ORAL 2 TIMES DAILY
Qty: 14 CAPSULE | Refills: 0 | Status: SHIPPED | OUTPATIENT
Start: 2023-06-27 | End: 2023-07-04

## 2023-06-27 RX ORDER — DICLOFENAC SODIUM 50 MG/1
50 TABLET, DELAYED RELEASE ORAL 3 TIMES DAILY PRN
Qty: 30 TABLET | Refills: 1 | Status: SHIPPED | OUTPATIENT
Start: 2023-06-27

## 2023-06-27 RX ORDER — TIZANIDINE 4 MG/1
4 TABLET ORAL NIGHTLY PRN
Qty: 15 TABLET | Refills: 1 | Status: SHIPPED | OUTPATIENT
Start: 2023-06-27 | End: 2023-09-08 | Stop reason: ALTCHOICE

## 2023-06-27 RX ORDER — MUPIROCIN 20 MG/G
OINTMENT TOPICAL
Qty: 22 G | Refills: 1 | Status: SHIPPED | OUTPATIENT
Start: 2023-06-27

## 2023-06-27 NOTE — PROGRESS NOTES
Patient Name: Mamadou Isaacs   : 1995  MRN: [id number]     SUBJECTIVE DATA:    CHIEF COMPLAINT:   Mamadou Isaacs is a 28 y.o. male who presents to clinic today with Eye Pain (Left eye) and Derm Referral        HPI: 28-year-old male presents to the clinic to discuss acne issues, left eye blurry vision, tension headache.     Chronic acne:  patient state in the past  his acne has been managed only with doxycycline.  Patient state last treatment was year and a half ago.  Patient state he never taking Accutane. State the acne to his  bilateral neck region is the worst.  Patient state they are tender to touch as well the acne to the left side of his neck drains often on.  Patient requesting dermatology referral. Cystic acne noted.  Infection noted. Discussed with patient the need to initiate a 7 day course of doxycycline 100 mg p.o. b.I.d. as well a topical Rx mupirocin to be applied very thin layer 3 times a day for 7 days and then as needed.  Keep the area clean.  [facility] dermatology referral initiated as well provided dermatology phone number in [place] at [phone] to call and schedule.  Questions solicited and answered, patient verbalized and agreed to plan of care.     Left eye issue:  patient state he feels sometimes his left eye becomes blurry.  Denies loss of vision.  Also sometimes feels his left eyelid droops a time.   Vision Screening    Right eye Left eye Both eyes   Without correction      With correction 20/15 20/20 20/15     Denies any symptoms today.   Patient states his last eye exam was last year.  Denies any recent fall or trauma.  Currently wears corrective glasses.Discussed with patient will refer to ophthalmology for further evaluation.  Patient agreed to plan of care and verbalized understanding.    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   Patient state when he takes ibuprofen as it helps a little bit.  Discussed with patient is call tension headache.  Needs to do stretching exercises.  Rest, ice needed for discomfort.  Stay hydrated with water.  Discussed Rx tizanidine 4 mg p.o. at bedtime as needed for tension headache, precaution discussed.  Rx  diclofenac  50 mg p.o. t.I.d. p.r.n. as needed for headache, take with food, do not take with any other NSAIDs such as Advil, Motrin, ibuprofen, Aleve or naproxen.  Take Tylenol OTC and follow direction on the label as needed for headache.  Questions solicited and answered, patient verbalized and agreed to plan of care.    Patient denies chest pain, shortness of breath, dyspnea on exertion, palpitations, peripheral edema, abdominal pain, nausea, vomiting, diarrhea, constipation, fatigue, fever, chills, dysuria,  hematuria, melena, or hematochezia.   HPI      ALLERGIES: Review of patient's allergies indicates:  No Known Allergies      ROS:  Review of Systems   Eyes:  Positive for blurred vision.   Skin:  Positive for rash.   Neurological:  Positive for headaches.   All other systems reviewed and are negative.      OBJECTIVE DATA:  Vital signs  Vitals:    06/27/23 0758   BP: 123/83   Pulse: 93   Resp: 18   Temp: 97.8 °F (36.6 °C)   TempSrc: Oral   SpO2: 99%   Weight: 104.8 kg (231 lb)   Height: 6' (1.829 m)      Body mass index is 31.33 kg/m².    PHYSICAL EXAM:   Physical Exam  Vitals and nursing note reviewed.   Constitutional:       General: He is not in acute distress.     Appearance: Normal appearance. He is well-developed, well-groomed and overweight. He is not ill-appearing, toxic-appearing or diaphoretic.   HENT:      Head: Normocephalic and atraumatic.      Right Ear: Tympanic membrane, ear canal and external ear normal. There is no impacted cerumen.      Left Ear: Tympanic membrane, ear canal and external ear normal. There is no impacted cerumen.      Nose: Nose normal. No congestion or rhinorrhea.       Right Nostril: No epistaxis.      Left Nostril: No epistaxis.      Right Turbinates: Not swollen.      Left Turbinates: Not swollen.      Right Sinus: No maxillary sinus tenderness or frontal sinus tenderness.      Left Sinus: No maxillary sinus tenderness or frontal sinus tenderness.      Mouth/Throat:      Lips: Pink.      Mouth: Mucous membranes are moist.      Tongue: Tongue does not deviate from midline.      Palate: No mass and lesions.      Pharynx: Oropharynx is clear. Uvula midline. No oropharyngeal exudate.   Eyes:      General: Lids are normal. Gaze aligned appropriately. No scleral icterus.     Extraocular Movements: Extraocular movements intact.      Conjunctiva/sclera: Conjunctivae normal.      Pupils: Pupils are equal, round, and reactive to light.      Visual Fields: Right eye visual fields normal and left eye visual fields normal.      Comments: Vision Screening  Right eye - Without correction:   With correction: 20/15  Left eye - Without correction:   With correction: 20/20  Both eyes - Without correction:   With correction: 20/15      Neck:      Trachea: Trachea and phonation normal.   Cardiovascular:      Rate and Rhythm: Normal rate and regular rhythm.      Pulses: Normal pulses.           Radial pulses are 2+ on the right side and 2+ on the left side.      Heart sounds: Normal heart sounds. No murmur heard.  Pulmonary:      Effort: Pulmonary effort is normal.      Breath sounds: Normal breath sounds and air entry. No wheezing or rhonchi.   Abdominal:      Palpations: Abdomen is soft.      Tenderness: There is no abdominal tenderness. There is no right CVA tenderness or left CVA tenderness.   Musculoskeletal:         General: Normal range of motion.      Cervical back: Full passive range of motion without pain, normal range of motion and neck supple. No rigidity or tenderness.      Right lower leg: No edema.      Left lower leg: No edema.   Lymphadenopathy:      Cervical: No cervical  adenopathy.      Right cervical: No superficial cervical adenopathy.     Left cervical: No superficial cervical adenopathy.   Skin:     General: Skin is warm.      Capillary Refill: Capillary refill takes less than 2 seconds.      Findings: No rash.   Neurological:      General: No focal deficit present.      Mental Status: He is alert and oriented to person, place, and time. Mental status is at baseline.      GCS: GCS eye subscore is 4. GCS verbal subscore is 5. GCS motor subscore is 6.      Cranial Nerves: Cranial nerves 2-12 are intact. No cranial nerve deficit.      Sensory: Sensation is intact. No sensory deficit.      Motor: Motor function is intact. No weakness.      Coordination: Coordination is intact. Coordination normal.      Gait: Gait is intact. Gait normal.   Psychiatric:         Attention and Perception: Attention and perception normal.         Mood and Affect: Mood and affect normal.         Speech: Speech normal.         Behavior: Behavior normal. Behavior is cooperative.         Thought Content: Thought content normal.         Cognition and Memory: Cognition and memory normal.         Judgment: Judgment normal.        ASSESSMENT/PLAN:  1. Blurry vision, left eye  Assessment & Plan:  Discussed with patient will refer to ophthalmology for further evaluation.  Patient agreed to plan of care and verbalized understanding.   if symptom worsens call 911 or go to nearest emergency department.   return to clinic as needed.    Orders:  -     Ambulatory referral/consult to Ophthalmology; Future; Expected date: 07/04/2023    2. Rash  Assessment & Plan:   Discussed with patient the need to initiate a 7 day course of doxycycline 100 mg p.o. b.I.d. as well a topical Rx mupirocin to be applied very thin layer 3 times a day for 7 days and then as needed. Discussed doxycycline medication side effects and sun exposure.  Keep the area clean.  [facility] dermatology referral initiated as well provided dermatology phone number  in [place] at [phone] to call and schedule.  Questions solicited and answered, patient verbalized and agreed to plan of care.    Orders:  -     doxycycline (MONODOX) 100 MG capsule; Take 1 capsule (100 mg total) by mouth 2 (two) times daily. for 7 days  Dispense: 14 capsule; Refill: 0  -     mupirocin (BACTROBAN) 2 % ointment; Apply to affected area 3 times daily for 7 days. And then as needed.  Dispense: 22 g; Refill: 1  -     Ambulatory referral/consult to Dermatology; Future; Expected date: 07/04/2023    3. Tension headache  Assessment & Plan:  Discussed with patient is call tension headache.  Needs to do stretching exercises.  Rest, ice needed for discomfort.  Stay hydrated with water.  Discussed Rx tizanidine 4 mg p.o. at bedtime as needed for tension headache, precaution discussed.  Rx  diclofenac  50 mg p.o. t.I.d. p.r.n. as needed for headache, take with food, do not take with any other NSAIDs such as Advil, Motrin, ibuprofen, Aleve or naproxen.  Take Tylenol OTC and follow direction on the label as needed for headache.  Questions solicited and answered, patient verbalized and agreed to plan of care.    Orders:  -     tiZANidine (ZANAFLEX) 4 MG tablet; Take 1 tablet (4 mg total) by mouth nightly as needed (headache/muscl spasms).  Dispense: 15 tablet; Refill: 1  -     diclofenac (VOLTAREN) 50 MG EC tablet; Take 1 tablet (50 mg total) by mouth 3 (three) times daily as needed (pain/headache).  Dispense: 30 tablet; Refill: 1           RESULTS:  No results found for this or any previous visit (from the past 1008 hour(s)).      Follow Up:   KEEP SCHEDULED APPOINTMENT.     Previous medical history/lab work/radiology reviewed and considered during medical management decisions.   Medication list reviewed and medication reconciliation performed.  Patient was provided  and care about his/her current diagnosis (es) and medications including risk/benefit and side effects/adverse events, over the counter  medication uses/doses, home self-care and contact precautions,  and red flags and indications for when to seek immediate medical attention.   Patient was advised to continue compliance with current medication list and medical recommendations.  Patient dvised continued compliance with recommended eating habits/ diets for medical conditions and exercise 150 minutes/ week (if possible) for medical condition (s).  Educational handouts and instructions on selected disease management in AVS (After Visit Summary).    All of the patient's questions were answered to patient's satisfaction.   The patient was receptive, expressed verbal understanding and agreement the above plan.         This note was created with the assistance of a voice recognition software or phone dictation. There may be transcription errors as a result of using this technology however minimal. Effort has been made to assure accuracy of transcription but any obvious errors or omissions should be clarified with the author of the document

## 2023-06-27 NOTE — ASSESSMENT & PLAN NOTE
Discussed with patient the need to initiate a 7 day course of doxycycline 100 mg p.o. b.I.d. as well a topical Rx mupirocin to be applied very thin layer 3 times a day for 7 days and then as needed. Discussed doxycycline medication side effects and sun exposure.  Keep the area clean.  [facility] dermatology referral initiated as well provided dermatology phone number in [place] at [phone] to call and schedule.  Questions solicited and answered, patient verbalized and agreed to plan of care.

## 2023-06-27 NOTE — ASSESSMENT & PLAN NOTE
Discussed with patient will refer to ophthalmology for further evaluation.  Patient agreed to plan of care and verbalized understanding.   if symptom worsens call 911 or go to nearest emergency department.   return to clinic as needed.

## 2023-06-27 NOTE — ASSESSMENT & PLAN NOTE
Discussed with patient is call tension headache.  Needs to do stretching exercises.  Rest, ice needed for discomfort.  Stay hydrated with water.  Discussed Rx tizanidine 4 mg p.o. at bedtime as needed for tension headache, precaution discussed.  Rx  diclofenac  50 mg p.o. t.I.d. p.r.n. as needed for headache, take with food, do not take with any other NSAIDs such as Advil, Motrin, ibuprofen, Aleve or naproxen.  Take Tylenol OTC and follow direction on the label as needed for headache.  Questions solicited and answered, patient verbalized and agreed to plan of care.

## 2023-07-10 ENCOUNTER — TELEPHONE (OUTPATIENT)
Dept: FAMILY MEDICINE | Facility: CLINIC | Age: 28
End: 2023-07-10
Payer: MEDICAID

## 2023-07-10 DIAGNOSIS — F41.0 PANIC DISORDER: Primary | ICD-10-CM

## 2023-07-10 RX ORDER — CLONAZEPAM 1 MG/1
1 TABLET ORAL DAILY PRN
Qty: 30 TABLET | Refills: 0 | Status: SHIPPED | OUTPATIENT
Start: 2023-07-10 | End: 2023-08-10 | Stop reason: SDUPTHER

## 2023-07-11 ENCOUNTER — OFFICE VISIT (OUTPATIENT)
Dept: CARDIOLOGY | Facility: CLINIC | Age: 28
End: 2023-07-11
Payer: MEDICAID

## 2023-07-11 VITALS
WEIGHT: 224.19 LBS | RESPIRATION RATE: 20 BRPM | HEART RATE: 96 BPM | TEMPERATURE: 98 F | HEIGHT: 72 IN | SYSTOLIC BLOOD PRESSURE: 144 MMHG | BODY MASS INDEX: 30.37 KG/M2 | DIASTOLIC BLOOD PRESSURE: 90 MMHG | OXYGEN SATURATION: 99 %

## 2023-07-11 DIAGNOSIS — R00.2 PALPITATIONS: Primary | ICD-10-CM

## 2023-07-11 DIAGNOSIS — R03.0 ELEVATED BP WITHOUT DIAGNOSIS OF HYPERTENSION: ICD-10-CM

## 2023-07-11 PROCEDURE — 99214 OFFICE O/P EST MOD 30 MIN: CPT | Mod: PBBFAC | Performed by: INTERNAL MEDICINE

## 2023-07-11 NOTE — PROGRESS NOTES
[facility] [place] [facility]  Cardiology Clinic      Date of Visit: 7/11/2023  Reason for Visit/Chief Complaint: palpitations    The patient was discussed with Dr. Nunn who agrees with the assessment and plan.        History of Present Illness:        Mamadou Isaacs is a 28 y.o. male with a PMHx GERD, panic disorder, previous opiate use disorder  who presents to cardiology clinic for follow up of palpitations and chest tightness event occurring since November 2022 with multiple ED visits.  Patient is now underwent extensive cardiac workup, including EKG, event monitoring, TTE, stress testing all of which have resulted negative.  Today, patient reports intermittent palpitations with much less frequency since initiation of Klonopin for newly diagnosed panic disorder by psychiatrist.  He denies fevers, chills, nausea, vomiting, chest pain, shortness breath, syncope, FIORE, abdominal pain, dysuria, hematuria, constipation or diarrhea.    PMHx: GERD, panic disorder, previous opiate use disorder    SurgicalHx: wisdom tooth extraction   FamilyHx: family history includes Atrial fibrillation in his maternal grandmother; Cancer in his maternal grandfather; Depression in his maternal grandmother and mother; Diabetes in his father and paternal grandfather; Hyperlipidemia in his father and mother; Hypertension in his father and mother.   SocialHx: Works in [employer]), smokes < 1/2 ppd, 5py hx, occasional alcohol, used to use opiates and heroin but has been clean for 6 years  Allergies: NKDA  Home Medications: klonopin 1 PRN, prilosec 40             Objective:     Wt Readings from Last 3 Encounters:   07/11/23 101.7 kg (224 lb 3.2 oz)   06/27/23 104.8 kg (231 lb)   06/19/23 103.6 kg (228 lb 4.8 oz)     Temp Readings from Last 3 Encounters:   07/11/23 97.5 °F (36.4 °C) (Oral)   06/27/23 97.8 °F (36.6 °C) (Oral)   06/19/23 98.3 °F (36.8 °C)     BP Readings from  Last 3 Encounters:   07/11/23 (!) 144/90   06/27/23 123/83   06/19/23 (!) 145/90     Pulse Readings from Last 3 Encounters:   07/11/23 96   06/27/23 93   06/19/23 96       Vitals:    07/11/23 0954 07/11/23 1026   BP: (!) 149/91 (!) 144/90   BP Location: Left arm    Patient Position: Sitting    BP Method: Medium (Automatic) Medium (Manual)   Pulse: 96    Resp: 20    Temp: 97.5 °F (36.4 °C)    TempSrc: Oral    SpO2: 99%    Weight: 101.7 kg (224 lb 3.2 oz)    Height: 6' (1.829 m)      Body mass index is 30.41 kg/m².    Physical Exam  Vitals and nursing note reviewed.   Constitutional:       Appearance: Normal appearance. He is not ill-appearing.   Eyes:      Extraocular Movements: Extraocular movements intact.      Pupils: Pupils are equal, round, and reactive to light.   Cardiovascular:      Rate and Rhythm: Normal rate and regular rhythm.      Pulses: Normal pulses.      Heart sounds: Normal heart sounds. No murmur heard.  Pulmonary:      Effort: Pulmonary effort is normal. No respiratory distress.      Breath sounds: Normal breath sounds. No rales.   Chest:      Chest wall: No tenderness.   Abdominal:      General: Bowel sounds are normal.      Palpations: Abdomen is soft.      Tenderness: There is no abdominal tenderness.   Musculoskeletal:      Cervical back: Normal range of motion and neck supple. No tenderness.      Right lower leg: No edema.      Left lower leg: No edema.   Skin:     Capillary Refill: Capillary refill takes less than 2 seconds.   Neurological:      Mental Status: He is alert.        Labs:   I have reviewed the following labs below:      CBC:  Lab Results   Component Value Date    WBC 11.7 (H) 03/08/2023    HGB 16.3 03/08/2023    HCT 49.5 03/08/2023     03/08/2023    MCV 85.1 03/08/2023    RDW 14.2 03/08/2023     BMP:  Lab Results   Component Value Date     03/08/2023    K 4.1 03/08/2023    CO2 27 03/08/2023    BUN 8.0 (L) 03/08/2023    CALCIUM 9.8 03/08/2023     LFTs:  Lab Results    Component Value Date    ALBUMIN 4.2 03/08/2023    BILITOT 0.7 03/08/2023    AST 23 03/08/2023    ALKPHOS 94 03/08/2023    ALT 20 03/08/2023     FLP:  Cholesterol Total   Date Value Ref Range Status   03/08/2023 170 <=200 mg/dL Final     HDL Cholesterol   Date Value Ref Range Status   03/08/2023 38 35 - 60 mg/dL Final     LDL Cholesterol   Date Value Ref Range Status   03/08/2023 98.00 50.00 - 140.00 mg/dL Final     Triglyceride   Date Value Ref Range Status   03/08/2023 171 (H) 34 - 140 mg/dL Final     DM:  Lab Results   Component Value Date    CREATININE 0.81 03/08/2023     Thyroid:  Lab Results   Component Value Date    TSH 1.016 03/08/2023     Anemia:No results found for: IRON, TIBC, FERRITIN, IKCODBNJ40, FOLATE  Coags:No results found for: INR, PROTIME, APTT   Cardiac:  Lab Results   Component Value Date    TROPONINI <0.01 12/13/2022    TROPONINI <0.01 11/21/2022       Cardiovascular Studies/Imaging:   I have reviewed the following studies below:      EKG: NSR without signs of arrhythmia    Echo  Interpretation Summary  · The left ventricle is normal in size with normal systolic function. LVEF 55-60%.  · Normal left ventricular diastolic function.  · Normal right ventricular size with normal right ventricular systolic function.  · Less than mild valvular stenosis/regurgitation.      Exercise Stress - EKG    Interpretation Summary    The patient exercised for 9 minutes 51 seconds on a Cruz protocol, corresponding to a functional capacity of 13 METS, achieving a peak heart rate of 166 bpm, which is 86 % of the age predicted maximum heart rate.    The ECG portion of the study is abnormal but not diagnostic.    Patient reached target heart rate  Minutes exercised:  9 min 51 sec  Adjusted ST deviation:  0 mm  Angina:  no angina (0)  [facility] treadmill score (Min - ST - Index):  10.  Patient's Score:  >5.    The patient has LOW risk for cardiovascular events (based on Alex Score)     Images:  No results found.          Assessment/Plan:     Mamadou Isaacs is a 28 y.o. male with PMHx anxiety, panic disorder who presents for follow up of palpitations likely due to panic disorder.     Palpitations  Extensive cardiac workup including ECG, event monitoring, TTE, stress testing all negative for cardiovascular etiology. Likely due to newly diagnosed panic disorder.  -RTC p.r.n.    Elevated BP without diagnosis of HTN  Today /90 without antihypertensive treatment  -defer initiation of antihypertensive to PCP.    Follow up if symptoms worsen or fail to improve.     Oscar Houston M.D.  [facility] Cardiology Fellow, PGY-4  Pager: [phone]  07/11/2023 10:49 AM  [facility]                                   HPI:  Mamadou Isaacs 28 y.o. male with GERD, previous opiate use disorder  who is referred for further evaluation of palpitations and associated chest tightness that has been occurring since Nov 2022. Pt had 2 ED visits at [facility] in Nov and Dec 2022 for palpitations, chest pain and presyncope. He had EKGs done that were WNL (report only available). Trop negative in both ED visits. He wore a 48 hour Holter monitor that was reportedly normal.   He reports episodes have been occurring less frequent recently. They occur randomly, about once or twice a week and usually occur in the afternoon. Episodes last 2 hours to half a day. He denies exertional dyspnea but reports if he overexerts himself at work he experiences some palpitations and chest tightness ans has been taking it easy.          ASSESSMENT/PLAN  Palpitations  Chest tightness  EKG today and in Jan 2023 show NSR  ED visits at [facility] with reportedly normal EKG, also normal trop in 2 ED visits  As symptoms not daily, will obtain a longer monitor for 2 weeks  Given smoking hx and family hx of CAD, will obtain a ETT   Will also obtain an echocardiogram to rule out structural heart disease      Smoking  Discussed deleterious effects of smoking and how smoking decreases  lifespan.  Pt wants to quit and wants to try on his own  Explained that resources are available if needed including smoking cessation program     Elevated BP without diagnosis of HTN  /102 with repeat 130/95  Recent BP in clinic was 128/84  Advised pt to keep a BP log to review on next visit or with PCP      4m F/U

## 2023-07-11 NOTE — PATIENT INSTRUCTIONS
Normal 30-day event monitor    Return to cardiology clinic as needed - no scheduled appt    Talk to your PCP about  your blood pressure

## 2023-07-11 NOTE — PROGRESS NOTES
Cardiology Attending    I have discussed Mamadou Isaacs including the patient's symptoms, findings, and management plan with the cardiology fellow.  Please see the Cardiology Note for details.

## 2023-07-27 ENCOUNTER — OFFICE VISIT (OUTPATIENT)
Dept: OPHTHALMOLOGY | Facility: CLINIC | Age: 28
End: 2023-07-27
Payer: MEDICAID

## 2023-07-27 VITALS — BODY MASS INDEX: 30.37 KG/M2 | WEIGHT: 224.19 LBS | HEIGHT: 72 IN

## 2023-07-27 DIAGNOSIS — H52.209 MYOPIA WITH ASTIGMATISM, UNSPECIFIED LATERALITY: ICD-10-CM

## 2023-07-27 DIAGNOSIS — H02.402 PTOSIS, LEFT EYELID: Primary | ICD-10-CM

## 2023-07-27 DIAGNOSIS — H52.10 MYOPIA WITH ASTIGMATISM, UNSPECIFIED LATERALITY: ICD-10-CM

## 2023-07-27 PROCEDURE — 99213 OFFICE O/P EST LOW 20 MIN: CPT | Mod: PBBFAC,PO | Performed by: STUDENT IN AN ORGANIZED HEALTH CARE EDUCATION/TRAINING PROGRAM

## 2023-07-27 NOTE — PROGRESS NOTES
"HPI     JAVON drooping      Additional comments: Patient states that for about 4 months his left   upper eyelid had felt "hevay" and is getting in the way of his vision.   Patient has to put in effort to get left eye opened a normal amount.            Blurred Vision     Additional comments: Patient states that vision left eye has been blurry   for a few months.     Last exam for glasses about 1 year ago.   Last dilation about 2-3 years about           Comments    Left upper eyelid drooping, blurry vision OS          Last edited by Cece Del Rio MA on 7/27/2023  9:12 AM.          7/27/23  Assessment /Plan     For exam results, see Encounter Report.    Ptosis, left eyelid  -     Acetylcholine Receptor, Binding; Future; Expected date: 07/27/2023  -     Miscellaneous Test, Sendout Acetylcholine Receptor AB - Modulating, Myasthenia Gravis; Future; Expected date: 07/27/2023  -     Miscellaneous Test, Sendout Acetylcholine Receptor AB - Blocking, Myasthenia Gravis; Future; Expected date: 07/27/2023  -     Musk Antibody Test; Future; Expected date: 07/27/2023    Myopia with astigmatism, unspecified laterality  -     Ambulatory referral/consult to Ophthalmology      Ptosis, left upper lid   - Present for approx 3 months, patient notices worsening of sxs at the end of the day  - Patient also with sensation of difficulty breathing at end of the day that improves with clonopin (patient with history of anxiety and panic attacks)  - MRD 1: 5//2 MRD 2: 7//6  - No fatigability with prolonged upgaze   - Negative ice pack test   - MG labs ordered today   - Difficulty to assess presence of ptosis OS in 's license photo, can try looking at photos on patient's phone next visit   - Encouraged patient to keep a log of symptoms and bring with him to next visit   - Encouraged patient to present to the ED if ever notices difficulty breathing that is not relieved by clonopin   - RTC 1 month lab review, repeat ice pack test     2. " Myopia with astigmatism   - BCVA 20/20  - Mrx provided today     RTC 1 month lab review, repeat ice pack test, symptom check

## 2023-08-10 ENCOUNTER — TELEPHONE (OUTPATIENT)
Dept: FAMILY MEDICINE | Facility: CLINIC | Age: 28
End: 2023-08-10
Payer: MEDICAID

## 2023-08-10 DIAGNOSIS — F41.0 PANIC DISORDER: Primary | ICD-10-CM

## 2023-08-10 RX ORDER — CLONAZEPAM 1 MG/1
1 TABLET ORAL DAILY PRN
Qty: 30 TABLET | Refills: 2 | Status: SHIPPED | OUTPATIENT
Start: 2023-08-10 | End: 2023-10-12 | Stop reason: SDUPTHER

## 2023-08-10 NOTE — TELEPHONE ENCOUNTER
Patient called the clinic to request refill of Klonopin.  PDMP reviewed and demonstrated no abnormal filling patterns.  Refills sent to pharmacy as requested.

## 2023-08-15 ENCOUNTER — OFFICE VISIT (OUTPATIENT)
Dept: BEHAVIORAL HEALTH | Facility: CLINIC | Age: 28
End: 2023-08-15
Payer: MEDICAID

## 2023-08-15 ENCOUNTER — LAB VISIT (OUTPATIENT)
Dept: LAB | Facility: HOSPITAL | Age: 28
End: 2023-08-15
Attending: STUDENT IN AN ORGANIZED HEALTH CARE EDUCATION/TRAINING PROGRAM
Payer: MEDICAID

## 2023-08-15 VITALS
OXYGEN SATURATION: 99 % | SYSTOLIC BLOOD PRESSURE: 138 MMHG | WEIGHT: 232.81 LBS | HEART RATE: 99 BPM | BODY MASS INDEX: 31.57 KG/M2 | TEMPERATURE: 99 F | DIASTOLIC BLOOD PRESSURE: 84 MMHG

## 2023-08-15 DIAGNOSIS — H02.402 PTOSIS, LEFT EYELID: ICD-10-CM

## 2023-08-15 DIAGNOSIS — F41.0 PANIC ATTACKS: ICD-10-CM

## 2023-08-15 DIAGNOSIS — F41.0 PANIC DISORDER: Primary | ICD-10-CM

## 2023-08-15 PROCEDURE — 3075F PR MOST RECENT SYSTOLIC BLOOD PRESS GE 130-139MM HG: ICD-10-PCS | Mod: CPTII,,, | Performed by: STUDENT IN AN ORGANIZED HEALTH CARE EDUCATION/TRAINING PROGRAM

## 2023-08-15 PROCEDURE — 3079F DIAST BP 80-89 MM HG: CPT | Mod: CPTII,,, | Performed by: STUDENT IN AN ORGANIZED HEALTH CARE EDUCATION/TRAINING PROGRAM

## 2023-08-15 PROCEDURE — 1160F RVW MEDS BY RX/DR IN RCRD: CPT | Mod: CPTII,,, | Performed by: STUDENT IN AN ORGANIZED HEALTH CARE EDUCATION/TRAINING PROGRAM

## 2023-08-15 PROCEDURE — 1160F PR REVIEW ALL MEDS BY PRESCRIBER/CLIN PHARMACIST DOCUMENTED: ICD-10-PCS | Mod: CPTII,,, | Performed by: STUDENT IN AN ORGANIZED HEALTH CARE EDUCATION/TRAINING PROGRAM

## 2023-08-15 PROCEDURE — 3075F SYST BP GE 130 - 139MM HG: CPT | Mod: CPTII,,, | Performed by: STUDENT IN AN ORGANIZED HEALTH CARE EDUCATION/TRAINING PROGRAM

## 2023-08-15 PROCEDURE — 83519 RIA NONANTIBODY: CPT

## 2023-08-15 PROCEDURE — 99213 OFFICE O/P EST LOW 20 MIN: CPT | Mod: PBBFAC,PN | Performed by: STUDENT IN AN ORGANIZED HEALTH CARE EDUCATION/TRAINING PROGRAM

## 2023-08-15 PROCEDURE — 3008F PR BODY MASS INDEX (BMI) DOCUMENTED: ICD-10-PCS | Mod: CPTII,,, | Performed by: STUDENT IN AN ORGANIZED HEALTH CARE EDUCATION/TRAINING PROGRAM

## 2023-08-15 PROCEDURE — 36415 COLL VENOUS BLD VENIPUNCTURE: CPT

## 2023-08-15 PROCEDURE — 99213 OFFICE O/P EST LOW 20 MIN: CPT | Mod: AF,HB,S$PBB, | Performed by: STUDENT IN AN ORGANIZED HEALTH CARE EDUCATION/TRAINING PROGRAM

## 2023-08-15 PROCEDURE — 1159F PR MEDICATION LIST DOCUMENTED IN MEDICAL RECORD: ICD-10-PCS | Mod: CPTII,,, | Performed by: STUDENT IN AN ORGANIZED HEALTH CARE EDUCATION/TRAINING PROGRAM

## 2023-08-15 PROCEDURE — 3079F PR MOST RECENT DIASTOLIC BLOOD PRESSURE 80-89 MM HG: ICD-10-PCS | Mod: CPTII,,, | Performed by: STUDENT IN AN ORGANIZED HEALTH CARE EDUCATION/TRAINING PROGRAM

## 2023-08-15 PROCEDURE — 3008F BODY MASS INDEX DOCD: CPT | Mod: CPTII,,, | Performed by: STUDENT IN AN ORGANIZED HEALTH CARE EDUCATION/TRAINING PROGRAM

## 2023-08-15 PROCEDURE — 1159F MED LIST DOCD IN RCRD: CPT | Mod: CPTII,,, | Performed by: STUDENT IN AN ORGANIZED HEALTH CARE EDUCATION/TRAINING PROGRAM

## 2023-08-15 PROCEDURE — 99213 PR OFFICE/OUTPT VISIT, EST, LEVL III, 20-29 MIN: ICD-10-PCS | Mod: AF,HB,S$PBB, | Performed by: STUDENT IN AN ORGANIZED HEALTH CARE EDUCATION/TRAINING PROGRAM

## 2023-08-15 NOTE — PROGRESS NOTES
"Outpatient Psychiatry Follow-Up Visit    8/15/2023    Clinical Status of Patient:  Outpatient (Ambulatory)    Chief Complaint:  Mamadou Isaacs is a 28 y.o. male who presents today for follow-up of anxiety and panic attacks . Patient last seen for follow-up on 6/19/2023. Met with patient and pt's mother.      Interval History and Content of Current Session:  Interim Events/Subjective Report/Content of Current Session:   Pt reports doing "much better"  overall.  Reports good mood, improved anxiety.  Much improved panic attacks, less anxiety about having panic attacks.    Sleep good. Appetite good, weight stable.  Energy improving, motivation good.  Denies irritability, denies hopelessness.  Denies SI/HI/AVH/paranoia, denies plan or desire for self harm or harm to others.  Denies SE from current regimen. Reports somatic complaints of droopin of L eye. Pt happy with current regimen and wants to continue.     Psychiatric Review of Systems-is patient experiencing or having changes in  Anxiety: much improved  Sleep: good  Appetite: stable  Weight: stable  Energy: improving  Concentration: stable  Libido: no problem voiced  Irritability: denies  Motivation: improving  Guilt/hopelessness: denies  Paranoia/delusions: denies  SIB/risky behavior: [id number]    Review of Systems   PSYCHIATRIC: Pertinant items are noted in the narrative.  CONSTITUTIONAL: No weight gain or loss.  MUSCULOSKELETAL: No pain or stiffness of the joints.  NEUROLOGIC: No weakness, sensory changes, seizures, confusion, memory loss, tremor or other abnormal movements.  CARDIAC: No CP, no palpitations  RESPIRATORY: No shortness of breath.  CARDIOVASCULAR: No tachycardia or chest pain.  GASTROINTESTINAL: No nausea, vomiting, pain, constipation or diarrhea.    Past Medical, Family and Social History: The patient's past medical, family and social history have been reviewed and updated as appropriate within the electronic medical record - see encounter " "notes.    Compliance: good    Side effects: denies    Risk Parameters:  Patient reports no suicidal ideation  Patient reports no homicidal ideation  Patient reports no self-injurious behavior  Patient reports no violent behavior    Exam (detailed: at least 9 elements; comprehensive: all 15 elements)   Constitutional  Vitals:  Most recent vital signs, dated less than 90 days prior to this appointment, were reviewed.     Vitals:    08/15/23 1229   BP: 138/84   Pulse: 99   Temp: 98.7 °F (37.1 °C)   SpO2: 99%   Weight: 105.6 kg (232 lb 12.8 oz)        General:   Constitutional: appears stated age, no acute distress    Neurologic:   Motor: Moves all extremities spontaneously, no abnormal involuntary movements observed  Gait: normal gait and station    Mental status examination:   Appearance: appears stated age, casually dressed  Behavior: anxious appearing, cooperative  Mood: "better"  Affect: anxious appearing, brighter  Thought process: linear and goal directed  Associations: appropriate for conversation  Thought content: no plan or desire for self harm or harm to others, denies paranoia, no delusional ideation volunteered  Perceptions: denies hallucinations or other altered perceptions  Orientation: oriented to day of week, month, year, location, and situation  Language: English, fluid  Attention: able to attend to interview  Insight: good  Judgement: good    PHQ9 8/15/2023   Total Score 0     GAD7 8/15/2023 6/19/2023 5/8/2023   1. Feeling nervous, anxious, or on edge? 0 3 3   2. Not being able to stop or control worrying? 1 3 1   3. Worrying too much about different things? 1 3 1   4. Trouble relaxing? 0 3 3   5. Being so restless that it is hard to sit still? 0 3 0   6. Becoming easily annoyed or irritable? 0 1 0   7. Feeling afraid as if something awful might happen? 0 3 3   8. If you checked off any problems, how difficult have these problems made it for you to do your work, take care of things at home, or get " along with other people? 0 3 2   HSI-7 Score 2 19 11     Assessment and Diagnosis   Status/Progress: Based on the examination today, the patient's problem(s) is/are improved.  New problems have not been presented today.   Co-morbidities and Lack of compliance are not complicating management of the primary condition.  Number of separate conditions addressed during today's visit: 2 (panic disorder improved, panic attacks improved).  Are medication adjustments being made today: No.  Are referral(s) being ordered today: No.  Complexity (level) of medical decision making employed in the encounter: MODERATE.    General Impression:    ICD-10-CM ICD-9-CM   1. Panic disorder  F41.0 300.01   2. Panic attacks  F41.0 300.01     Intervention/Counseling/Treatment Plan   Continue klonopin 0.5mg daily prn, PDMP reviewed and demonstrated no abnormal filling patterns  Recommend pt establish with a psychotherapist/counselor, pt not interested at this time  Recent labwork in EMR reviewed, CBC w/ elevated WBC, CMP wnl, TSH wnl  UDS negative at initial visit  No need for PEC as pt is not an imminent danger to self or others or gravely disabled due to acute psychiatric illness  Discussed that pt should either call clinic for psychiatric crisis symptoms or present to nearest emergency room    Discussed with patient informed consent including diagnosis, risks and benefits of proposed treatment above vs. alternative treatments vs. no treatment, as well as serious and common side effects of these treatments, and the inherent unpredictability of individual responses to these treatments. The patient expresses understanding of the above and displays the capacity to agree with this current plan. Patient also agrees that, currently, the benefits outweigh the risks and would like to pursue treatment at this time, and had no other questions.    Instructions:  Take all medications as prescribed.    Abstain from recreational drugs and  alcohol.  Present to ED or call 911 for SI/HI plan or intent, psychosis, or medical emergency.    Return to Clinic: Follow up in about 3 months (around 11/15/2023).    Total time:   The total time for services performed on the date of the encounter (including review of prior visit notes, review of notes from other providers, review of results from laboratory/imaging studies, face-to-face time with patient, and time spent on other activities directly related to patient care): 15 minutes.    Rickie Oviedo MD  [facility]

## 2023-08-18 LAB — MUSK AB SER-SCNC: 0 NMOL/L (ref 0–0.02)

## 2023-08-21 LAB — ACHR BIND AB SER-SCNC: NORMAL NMOL/L

## 2023-09-07 ENCOUNTER — OFFICE VISIT (OUTPATIENT)
Dept: OPHTHALMOLOGY | Facility: CLINIC | Age: 28
End: 2023-09-07
Payer: MEDICAID

## 2023-09-07 VITALS — WEIGHT: 233.69 LBS | BODY MASS INDEX: 31.65 KG/M2 | HEIGHT: 72 IN

## 2023-09-07 DIAGNOSIS — H02.402 PTOSIS, LEFT EYELID: ICD-10-CM

## 2023-09-07 DIAGNOSIS — H52.209 MYOPIA WITH ASTIGMATISM, UNSPECIFIED LATERALITY: Primary | ICD-10-CM

## 2023-09-07 DIAGNOSIS — H52.10 MYOPIA WITH ASTIGMATISM, UNSPECIFIED LATERALITY: Primary | ICD-10-CM

## 2023-09-07 PROCEDURE — 99212 OFFICE O/P EST SF 10 MIN: CPT | Mod: PBBFAC,PO | Performed by: STUDENT IN AN ORGANIZED HEALTH CARE EDUCATION/TRAINING PROGRAM

## 2023-09-07 NOTE — PROGRESS NOTES
\[facility] [place] [facility]\""    RTC 1 month lab review, repeat ice pack test, symptom check   Last edited by Noemi River RN on 9/7/2023 12:43 PM.          7/27/23  Assessment /Plan     For exam results, see Encounter Report.    There are no diagnoses linked to this encounter.    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 Rivera in [facility] 1 year routine eye exam    2. Myopia with astigmatism   - BCVA 20/20  - Mrx provided previously    RTC 1 year

## 2023-09-08 ENCOUNTER — OFFICE VISIT (OUTPATIENT)
Dept: FAMILY MEDICINE | Facility: CLINIC | Age: 28
End: 2023-09-08
Payer: MEDICAID

## 2023-09-08 VITALS
BODY MASS INDEX: 31.29 KG/M2 | TEMPERATURE: 98 F | RESPIRATION RATE: 18 BRPM | OXYGEN SATURATION: 99 % | HEIGHT: 72 IN | SYSTOLIC BLOOD PRESSURE: 127 MMHG | DIASTOLIC BLOOD PRESSURE: 87 MMHG | HEART RATE: 84 BPM | WEIGHT: 231 LBS

## 2023-09-08 DIAGNOSIS — R09.89 THROAT TIGHTNESS: Primary | ICD-10-CM

## 2023-09-08 PROCEDURE — 99213 OFFICE O/P EST LOW 20 MIN: CPT | Mod: S$PBB,,, | Performed by: NURSE PRACTITIONER

## 2023-09-08 PROCEDURE — 3074F PR MOST RECENT SYSTOLIC BLOOD PRESSURE < 130 MM HG: ICD-10-PCS | Mod: CPTII,,, | Performed by: NURSE PRACTITIONER

## 2023-09-08 PROCEDURE — 3079F PR MOST RECENT DIASTOLIC BLOOD PRESSURE 80-89 MM HG: ICD-10-PCS | Mod: CPTII,,, | Performed by: NURSE PRACTITIONER

## 2023-09-08 PROCEDURE — 1160F PR REVIEW ALL MEDS BY PRESCRIBER/CLIN PHARMACIST DOCUMENTED: ICD-10-PCS | Mod: CPTII,,, | Performed by: NURSE PRACTITIONER

## 2023-09-08 PROCEDURE — 3008F BODY MASS INDEX DOCD: CPT | Mod: CPTII,,, | Performed by: NURSE PRACTITIONER

## 2023-09-08 PROCEDURE — 3008F PR BODY MASS INDEX (BMI) DOCUMENTED: ICD-10-PCS | Mod: CPTII,,, | Performed by: NURSE PRACTITIONER

## 2023-09-08 PROCEDURE — 1159F PR MEDICATION LIST DOCUMENTED IN MEDICAL RECORD: ICD-10-PCS | Mod: CPTII,,, | Performed by: NURSE PRACTITIONER

## 2023-09-08 PROCEDURE — 1160F RVW MEDS BY RX/DR IN RCRD: CPT | Mod: CPTII,,, | Performed by: NURSE PRACTITIONER

## 2023-09-08 PROCEDURE — 3079F DIAST BP 80-89 MM HG: CPT | Mod: CPTII,,, | Performed by: NURSE PRACTITIONER

## 2023-09-08 PROCEDURE — 3074F SYST BP LT 130 MM HG: CPT | Mod: CPTII,,, | Performed by: NURSE PRACTITIONER

## 2023-09-08 PROCEDURE — 1159F MED LIST DOCD IN RCRD: CPT | Mod: CPTII,,, | Performed by: NURSE PRACTITIONER

## 2023-09-08 PROCEDURE — 99213 PR OFFICE/OUTPT VISIT, EST, LEVL III, 20-29 MIN: ICD-10-PCS | Mod: S$PBB,,, | Performed by: NURSE PRACTITIONER

## 2023-09-08 PROCEDURE — 99214 OFFICE O/P EST MOD 30 MIN: CPT | Mod: PBBFAC | Performed by: NURSE PRACTITIONER

## 2023-09-08 NOTE — PROGRESS NOTES
"Patient Name: Mamadou Isaacs   : 1995  MRN: [id number]     SUBJECTIVE DATA:    CHIEF COMPLAINT:   Mamadou Isaacs is a 28 y.o. male who presents to clinic today with Throat Closing        HPI:  28-year-old male presents to the clinic to discuss throat tightness off and on.  It started about a month ago.  Patient state" by throat will get tight" " it makes it hard to breathe".  Patient denies state it can last for an hour sometimes it lasts the whole day.  Patient state he noticed it in the afternoon sitting down watching TV.  Patient has met with Dr. Jaramillo gastroenterologist on 2023 diagnosed with dysphagia, pharyngeal esophageal phase, epigastric pain, heartburn.  Patient denies any chills or fever. Discussed with patient that is possibly acid reflux that she is feeling esophagus that causing you to feel the closure due to inflammation. Patient state he used to do well on omeprazole 40 mg twice a day instead of once a day.  Patient state will try taking omeprazole twice a day and he will notify the clinic if the symptoms improves.  Also discussed smoke cessation, patient declined at this time. Instructed patient at any time if symptom worsens to call 911 and go to nearest emergency department.  Questions solicited and answered, patient agreed and verbalized understanding.    Patient denies chest pain, shortness of breath, dyspnea on exertion, palpitations, peripheral edema, abdominal pain, nausea, vomiting, diarrhea, constipation, fatigue, fever, chills, dysuria,  hematuria, melena, or hematochezia.     Patient to return in 3 months for follow-up or sooner if needed.      ALLERGIES: Review of patient's allergies indicates:  No Known Allergies      ROS:  Review of Systems   HENT:          "Off and on throat tightness".   All other systems reviewed and are negative.        OBJECTIVE DATA:  Vital signs  Vitals:    23 0954   BP: 127/87   Pulse: 84   Resp: 18   Temp: 97.8 °F (36.6 °C)   TempSrc: " Oral   SpO2: 99%   Weight: 104.8 kg (231 lb)   Height: 6' (1.829 m)      Body mass index is 31.33 kg/m².    PHYSICAL EXAM:   Physical Exam  Vitals and nursing note reviewed.   Constitutional:       General: He is awake. He is not in acute distress.     Appearance: Normal appearance. He is well-developed, well-groomed and overweight. He is not ill-appearing, toxic-appearing or diaphoretic.   HENT:      Head: Normocephalic and atraumatic.      Right Ear: Tympanic membrane, ear canal and external ear normal.      Left Ear: Tympanic membrane, ear canal and external ear normal.      Nose: Nose normal. No congestion or rhinorrhea.      Mouth/Throat:      Mouth: Mucous membranes are moist.      Tongue: No lesions. Tongue does not deviate from midline.      Palate: No mass and lesions.      Pharynx: Oropharynx is clear. Uvula midline. No posterior oropharyngeal erythema.   Eyes:      General: Lids are normal. No scleral icterus.     Extraocular Movements: Extraocular movements intact.      Conjunctiva/sclera: Conjunctivae normal.      Pupils: Pupils are equal, round, and reactive to light.   Neck:      Trachea: Trachea and phonation normal.   Cardiovascular:      Rate and Rhythm: Normal rate and regular rhythm.      Pulses: Normal pulses.           Radial pulses are 2+ on the right side and 2+ on the left side.      Heart sounds: Normal heart sounds. No murmur heard.  Pulmonary:      Effort: Pulmonary effort is normal.      Breath sounds: Normal breath sounds and air entry. No wheezing or rhonchi.   Abdominal:      General: Bowel sounds are normal. There is no distension.      Palpations: Abdomen is soft. There is no mass.      Tenderness: There is no abdominal tenderness. There is no right CVA tenderness, left CVA tenderness, guarding or rebound.      Hernia: No hernia is present.   Musculoskeletal:         General: Normal range of motion.      Cervical back: Full passive range of motion without pain, normal range of  "motion and neck supple. No rigidity or tenderness. No pain with movement or muscular tenderness. Normal range of motion.      Right lower leg: No edema.      Left lower leg: No edema.   Lymphadenopathy:      Cervical: No cervical adenopathy.   Skin:     General: Skin is warm.      Capillary Refill: Capillary refill takes less than 2 seconds.      Findings: No rash.   Neurological:      General: No focal deficit present.      Mental Status: He is alert and oriented to person, place, and time. Mental status is at baseline.      GCS: GCS eye subscore is 4. GCS verbal subscore is 5. GCS motor subscore is 6.      Cranial Nerves: Cranial nerves 2-12 are intact.      Sensory: Sensation is intact.      Motor: Motor function is intact.      Coordination: Coordination is intact.      Gait: Gait is intact. Gait normal.   Psychiatric:         Attention and Perception: Attention and perception normal.         Mood and Affect: Mood and affect normal.         Speech: Speech normal.         Behavior: Behavior normal. Behavior is cooperative.         Thought Content: Thought content normal.         Cognition and Memory: Cognition and memory normal.         Judgment: Judgment normal.          ASSESSMENT/PLAN:  1. Throat tightness  Assessment & Plan:  28-year-old male presents to the clinic to discuss throat tightness off and on.  It started about a month ago.  Patient state" by throat will get tight" " it makes it hard to breathe".  Patient denies state it can last for an hour sometimes it lasts the whole day.  Patient state he noticed it in the afternoon sitting down watching TV.  Patient has met with Dr. Jaramilol gastroenterologist on March 9th 2023 diagnosed with dysphagia, pharyngeal esophageal phase, epigastric pain, heartburn.  Patient denies any chills or fever. Discussed with patient that is possibly acid reflux that she is feeling esophagus that causing you to feel the closure due to inflammation. Patient state he used to do " well on omeprazole 40 mg twice a day instead of once a day.  Patient state will try taking omeprazole twice a day and he will notify the clinic if the symptoms improves.  Also discussed smoke cessation, patient declined at this time. Instructed patient at any time if symptom worsens to call 911 and go to nearest emergency department.  Questions solicited and answered, patient agreed and verbalized understanding.             RESULTS:  Recent Results (from the past 1008 hour(s))   Acetylcholine Receptor, Binding    Collection Time: 08/15/23 10:47 AM   Result Value Ref Range    ACh Receptor (Muscle) Binding Ab 0.0 nmol/L    Musk Antibody Test    Collection Time: 08/15/23 10:48 AM   Result Value Ref Range    MuSK Autoantibody 0.00 0.00 - 0.02 nmol/L         Follow Up:  Follow up in about 3 months (around 12/8/2023).     Face to face time 25 minutes, including documentation, chart review, counseling, education, review of test results, relevant medical records, and coordination of care.   I have explained the treatment plan, diagnosis, and prognosis to patient. All questions are answered to the best of my knowledge.     Previous medical history/lab work/radiology reviewed and considered during medical management decisions.   Medication list reviewed and medication reconciliation performed.  Patient was provided  and care about his/her current diagnosis (es) and medications including risk/benefit and side effects/adverse events, over the counter medication uses/doses, home self-care and contact precautions,  and red flags and indications for when to seek immediate medical attention.   Patient was advised to continue compliance with current medication list and medical recommendations.  Patient dvised continued compliance with recommended eating habits/ diets for medical conditions and exercise 150 minutes/ week (if possible) for medical condition (s).  Educational handouts and instructions on selected disease  management in AVS (After Visit Summary).    All of the patient's questions were answered to patient's satisfaction.   The patient was receptive, expressed verbal understanding and agreement the above plan.     This note was created with the assistance of a voice recognition software or phone dictation. There may be transcription errors as a result of using this technology however minimal. Effort has been made to assure accuracy of transcription but any obvious errors or omissions should be clarified with the author of the document

## 2023-09-11 DIAGNOSIS — H02.402 PTOSIS, LEFT EYELID: Primary | ICD-10-CM

## 2023-09-14 PROBLEM — R09.89 THROAT TIGHTNESS: Status: ACTIVE | Noted: 2023-09-14

## 2023-09-14 NOTE — ASSESSMENT & PLAN NOTE
"28-year-old male presents to the clinic to discuss throat tightness off and on.  It started about a month ago.  Patient state" by throat will get tight" " it makes it hard to breathe".  Patient denies state it can last for an hour sometimes it lasts the whole day.  Patient state he noticed it in the afternoon sitting down watching TV.  Patient has met with Dr. Jaramillo gastroenterologist on March 9th 2023 diagnosed with dysphagia, pharyngeal esophageal phase, epigastric pain, heartburn.  Patient denies any chills or fever. Discussed with patient that is possibly acid reflux that she is feeling esophagus that causing you to feel the closure due to inflammation. Patient state he used to do well on omeprazole 40 mg twice a day instead of once a day.  Patient state will try taking omeprazole twice a day and he will notify the clinic if the symptoms improves.  Also discussed smoke cessation, patient declined at this time. Instructed patient at any time if symptom worsens to call 911 and go to nearest emergency department.  Questions solicited and answered, patient agreed and verbalized understanding.  "

## 2023-10-12 DIAGNOSIS — F41.0 PANIC DISORDER: Primary | ICD-10-CM

## 2023-10-12 RX ORDER — CLONAZEPAM 1 MG/1
1 TABLET ORAL DAILY PRN
Qty: 30 TABLET | Refills: 2 | Status: SHIPPED | OUTPATIENT
Start: 2023-11-10 | End: 2023-11-08 | Stop reason: SDUPTHER

## 2023-10-12 NOTE — PROGRESS NOTES
Received refill request for klonopin via fax from pharmacy.  PDMP reviewed and demonstrated no abnormal filling patterns.  Refill submitted as requested.  Will follow up with patient as currently scheduled.

## 2023-11-08 DIAGNOSIS — K21.9 GASTROESOPHAGEAL REFLUX DISEASE WITHOUT ESOPHAGITIS: ICD-10-CM

## 2023-11-08 DIAGNOSIS — F41.0 PANIC DISORDER: Primary | ICD-10-CM

## 2023-11-08 RX ORDER — CLONAZEPAM 1 MG/1
1 TABLET ORAL DAILY PRN
Qty: 30 TABLET | Refills: 2 | Status: SHIPPED | OUTPATIENT
Start: 2023-11-10 | End: 2024-01-08 | Stop reason: SDUPTHER

## 2023-11-08 RX ORDER — OMEPRAZOLE 40 MG/1
40 CAPSULE, DELAYED RELEASE ORAL EVERY MORNING
Qty: 30 CAPSULE | Refills: 3 | Status: SHIPPED | OUTPATIENT
Start: 2023-11-08 | End: 2023-12-11 | Stop reason: ALTCHOICE

## 2023-11-08 NOTE — TELEPHONE ENCOUNTER
Refill request received for clonazepam.  Per PDMP review, patient is about 2 days early to fill this medication.  Will allow early fill this time, but will monitor in the future.  If this becomes a pattern, will discuss with patient.

## 2023-11-15 ENCOUNTER — OFFICE VISIT (OUTPATIENT)
Dept: BEHAVIORAL HEALTH | Facility: CLINIC | Age: 28
End: 2023-11-15
Payer: MEDICAID

## 2023-11-15 DIAGNOSIS — F41.0 PANIC ATTACKS: ICD-10-CM

## 2023-11-15 DIAGNOSIS — F41.0 PANIC DISORDER: Primary | ICD-10-CM

## 2023-11-15 PROCEDURE — 99213 PR OFFICE/OUTPT VISIT, EST, LEVL III, 20-29 MIN: ICD-10-PCS | Mod: AF,HB,S$PBB,NDTC | Performed by: STUDENT IN AN ORGANIZED HEALTH CARE EDUCATION/TRAINING PROGRAM

## 2023-11-15 PROCEDURE — 1159F MED LIST DOCD IN RCRD: CPT | Mod: CPTII,NDTC,, | Performed by: STUDENT IN AN ORGANIZED HEALTH CARE EDUCATION/TRAINING PROGRAM

## 2023-11-15 PROCEDURE — 1160F PR REVIEW ALL MEDS BY PRESCRIBER/CLIN PHARMACIST DOCUMENTED: ICD-10-PCS | Mod: CPTII,NDTC,, | Performed by: STUDENT IN AN ORGANIZED HEALTH CARE EDUCATION/TRAINING PROGRAM

## 2023-11-15 PROCEDURE — 99213 OFFICE O/P EST LOW 20 MIN: CPT | Mod: AF,HB,S$PBB,NDTC | Performed by: STUDENT IN AN ORGANIZED HEALTH CARE EDUCATION/TRAINING PROGRAM

## 2023-11-15 PROCEDURE — 1159F PR MEDICATION LIST DOCUMENTED IN MEDICAL RECORD: ICD-10-PCS | Mod: CPTII,NDTC,, | Performed by: STUDENT IN AN ORGANIZED HEALTH CARE EDUCATION/TRAINING PROGRAM

## 2023-11-15 PROCEDURE — 1160F RVW MEDS BY RX/DR IN RCRD: CPT | Mod: CPTII,NDTC,, | Performed by: STUDENT IN AN ORGANIZED HEALTH CARE EDUCATION/TRAINING PROGRAM

## 2023-11-15 NOTE — PROGRESS NOTES
"Outpatient Psychiatry Follow-Up Visit    11/15/2023    Clinical Status of Patient:  Outpatient (Ambulatory)    Chief Complaint:  Mamadou Isaacs is a 28 y.o. male who presents today for follow-up of anxiety and panic attacks . Patient last seen for follow-up on 8/15/2023. Met with patient.      TELE PSYCHIATRY Disclaimer   *The patient was informed despite using HIPPA compliant technology there may be risks including security breach, technological failure, inability to perform a comprehensive physical exam which could delay or prevent an accurate diagnosis, and potential complications from treatment decisions rendered over a telemedical platform.   The patient was also informed of the relationship between the physician and patient and the respective role of any other health care provider with respect to management of the patient; and notified that the pt may decline to receive medical services by telemedicine and may withdraw from such care at any time.     Patient's Current location: [address] [place] [address]     In Case of Emergency pts next of kin  Name:Laureen Isaacs (mother)  Phone number:  [phone]  Visit type: Virtual visit with synchronous audio and video  Total time spent with patient: 15 minutes    Interval History and Content of Current Session:  Interim Events/Subjective Report/Content of Current Session:   Pt reports doing "great"  overall.  Reports good mood, denies depression, well controlled anxiety.  Sleep good. Appetite good, weight stable.  Energy good, motivation good.  Denies irritability, denies hopelessness.  Denies SI/HI/AVH/paranoia, denies plan or desire for self harm or harm to others.  Denies SE from current regimen. Reports somatic complaints of sore throat and nasal congestion. Pt happy with current regimen and wants to continue.     Psychiatric Review of Systems-is patient experiencing or having changes in  See HPI above    Review of Systems   PSYCHIATRIC: Pertinant " "items are noted in the narrative.  CONSTITUTIONAL: No weight gain or loss.  HEENT: +nasal congestion, +sore throat, +post nasal drip  MUSCULOSKELETAL: No pain or stiffness of the joints.  NEUROLOGIC: No weakness, sensory changes, seizures, confusion, memory loss, tremor or other abnormal movements.  CARDIAC: No CP, no palpitations  RESPIRATORY: No shortness of breath.  CARDIOVASCULAR: No tachycardia or chest pain.  GASTROINTESTINAL: No nausea, vomiting, pain, constipation or diarrhea.    Past Medical, Family and Social History: The patient's past medical, family and social history have been reviewed and updated as appropriate within the electronic medical record - see encounter notes.    Compliance: good    Side effects: denies    Risk Parameters:  Patient reports no suicidal ideation  Patient reports no homicidal ideation  Patient reports no self-injurious behavior  Patient reports no violent behavior    Exam (detailed: at least 9 elements; comprehensive: all 15 elements)   Constitutional  Vitals:  Most recent vital signs, dated less than 90 days prior to this appointment, were reviewed.     There were no vitals filed for this visit.       General:   Constitutional: appears stated age, no acute distress    Neurologic:   Motor: Moves all extremities spontaneously, no abnormal involuntary movements observed  Gait: [facility] 2/2 virtual visit    Mental status examination:   Appearance: appears stated age, casually dressed  Behavior: anxious appearing, cooperative  Mood: "good"  Affect: anxious appearing, constricted  Thought process: linear and goal directed  Associations: appropriate for conversation  Thought content: no plan or desire for self harm or harm to others, denies paranoia, no delusional ideation volunteered  Perceptions: denies hallucinations or other altered perceptions  Orientation: oriented to day of week, month, year, location, and situation  Language: English, fluid  Attention: able to attend to " interview  Insight: good  Judgement: good    PHQ9:  Over the last two weeks how often have you been bothered by little interest or pleasure in doing things: 0  Over the last two weeks how often have you been bothered by feeling down, depressed or hopeless: 0  PHQ-2 Total Score: 0  PHQ-9 Score: 0  PHQ-9 Interpretation: Minimal or None    GAD7:      8/15/2023    12:28 PM 6/19/2023    11:24 AM 5/8/2023     9:01 AM   GAD7   1. Feeling nervous, anxious, or on edge? 0 3 3   2. Not being able to stop or control worrying? 1 3 1   3. Worrying too much about different things? 1 3 1   4. Trouble relaxing? 0 3 3   5. Being so restless that it is hard to sit still? 0 3 0   6. Becoming easily annoyed or irritable? 0 1 0   7. Feeling afraid as if something awful might happen? 0 3 3   8. If you checked off any problems, how difficult have these problems made it for you to do your work, take care of things at home, or get along with other people? 0 3 2   SHI-7 Score 2 19 11     Assessment and Diagnosis   Status/Progress: Based on the examination today, the patient's problem(s) is/are well controlled.  New problems have not been presented today.   Co-morbidities and Lack of compliance are not complicating management of the primary condition.  Number of separate conditions addressed during today's visit: 1 (anxiety well controlled).  Are medication adjustments being made today: No.  Are referral(s) being ordered today: No.  Complexity (level) of medical decision making employed in the encounter: LOW.    General Impression:    ICD-10-CM ICD-9-CM   1. Panic disorder  F41.0 300.01   2. Panic attacks  F41.0 300.01     Intervention/Counseling/Treatment Plan   Continue klonopin 0.5mg daily prn, PDMP reviewed and demonstrated no abnormal filling patterns  Recent labwork in EMR reviewed, CBC w/ elevated WBC, CMP wnl, TSH wnl  UDS negative at initial visit  No need for PEC as pt is not an imminent danger to self or others or gravely disabled  due to acute psychiatric illness  Discussed that pt should either call clinic for psychiatric crisis symptoms or present to nearest emergency room    Discussed with patient informed consent including diagnosis, risks and benefits of proposed treatment above vs. alternative treatments vs. no treatment, as well as serious and common side effects of these treatments, and the inherent unpredictability of individual responses to these treatments. The patient expresses understanding of the above and displays the capacity to agree with this current plan. Patient also agrees that, currently, the benefits outweigh the risks and would like to pursue treatment at this time, and had no other questions.    Instructions:  Take all medications as prescribed.    Abstain from recreational drugs and alcohol.  Present to ED or call 911 for SI/HI plan or intent, psychosis, or medical emergency.    Return to Clinic: Follow up in about 6 months (around 5/15/2024).    Total time:   The total time for services performed on the date of the encounter (including review of prior visit notes, review of notes from other providers, review of results from laboratory/imaging studies, face-to-face time with patient, and time spent on other activities directly related to patient care): 15 minutes.    Rickie Oviedo MD  [facility]

## 2023-12-11 ENCOUNTER — OFFICE VISIT (OUTPATIENT)
Dept: FAMILY MEDICINE | Facility: CLINIC | Age: 28
End: 2023-12-11
Payer: MEDICAID

## 2023-12-11 VITALS
TEMPERATURE: 98 F | HEIGHT: 72 IN | WEIGHT: 231 LBS | OXYGEN SATURATION: 99 % | HEART RATE: 76 BPM | BODY MASS INDEX: 31.29 KG/M2 | RESPIRATION RATE: 18 BRPM | DIASTOLIC BLOOD PRESSURE: 88 MMHG | SYSTOLIC BLOOD PRESSURE: 128 MMHG

## 2023-12-11 DIAGNOSIS — K21.9 GASTROESOPHAGEAL REFLUX DISEASE WITHOUT ESOPHAGITIS: ICD-10-CM

## 2023-12-11 DIAGNOSIS — F17.200 TOBACCO DEPENDENCY: ICD-10-CM

## 2023-12-11 DIAGNOSIS — R09.89 THROAT TIGHTNESS: ICD-10-CM

## 2023-12-11 DIAGNOSIS — J01.00 ACUTE NON-RECURRENT MAXILLARY SINUSITIS: Primary | ICD-10-CM

## 2023-12-11 PROCEDURE — 3074F PR MOST RECENT SYSTOLIC BLOOD PRESSURE < 130 MM HG: ICD-10-PCS | Mod: CPTII,,, | Performed by: NURSE PRACTITIONER

## 2023-12-11 PROCEDURE — 99215 OFFICE O/P EST HI 40 MIN: CPT | Mod: PBBFAC | Performed by: NURSE PRACTITIONER

## 2023-12-11 PROCEDURE — 1160F RVW MEDS BY RX/DR IN RCRD: CPT | Mod: CPTII,,, | Performed by: NURSE PRACTITIONER

## 2023-12-11 PROCEDURE — 99214 OFFICE O/P EST MOD 30 MIN: CPT | Mod: S$PBB,,, | Performed by: NURSE PRACTITIONER

## 2023-12-11 PROCEDURE — 3008F PR BODY MASS INDEX (BMI) DOCUMENTED: ICD-10-PCS | Mod: CPTII,,, | Performed by: NURSE PRACTITIONER

## 2023-12-11 PROCEDURE — 1159F PR MEDICATION LIST DOCUMENTED IN MEDICAL RECORD: ICD-10-PCS | Mod: CPTII,,, | Performed by: NURSE PRACTITIONER

## 2023-12-11 PROCEDURE — 1160F PR REVIEW ALL MEDS BY PRESCRIBER/CLIN PHARMACIST DOCUMENTED: ICD-10-PCS | Mod: CPTII,,, | Performed by: NURSE PRACTITIONER

## 2023-12-11 PROCEDURE — 3074F SYST BP LT 130 MM HG: CPT | Mod: CPTII,,, | Performed by: NURSE PRACTITIONER

## 2023-12-11 PROCEDURE — 3079F DIAST BP 80-89 MM HG: CPT | Mod: CPTII,,, | Performed by: NURSE PRACTITIONER

## 2023-12-11 PROCEDURE — 3079F PR MOST RECENT DIASTOLIC BLOOD PRESSURE 80-89 MM HG: ICD-10-PCS | Mod: CPTII,,, | Performed by: NURSE PRACTITIONER

## 2023-12-11 PROCEDURE — 3008F BODY MASS INDEX DOCD: CPT | Mod: CPTII,,, | Performed by: NURSE PRACTITIONER

## 2023-12-11 PROCEDURE — 99214 PR OFFICE/OUTPT VISIT, EST, LEVL IV, 30-39 MIN: ICD-10-PCS | Mod: S$PBB,,, | Performed by: NURSE PRACTITIONER

## 2023-12-11 PROCEDURE — 1159F MED LIST DOCD IN RCRD: CPT | Mod: CPTII,,, | Performed by: NURSE PRACTITIONER

## 2023-12-11 RX ORDER — FLUTICASONE PROPIONATE 50 MCG
2 SPRAY, SUSPENSION (ML) NASAL DAILY
Qty: 16 G | Refills: 0 | Status: SHIPPED | OUTPATIENT
Start: 2023-12-11

## 2023-12-11 RX ORDER — OMEPRAZOLE 40 MG/1
40 CAPSULE, DELAYED RELEASE ORAL
Qty: 60 CAPSULE | Refills: 11 | Status: SHIPPED | OUTPATIENT
Start: 2023-12-11 | End: 2024-12-10

## 2023-12-11 RX ORDER — CETIRIZINE HYDROCHLORIDE 10 MG/1
10 TABLET ORAL NIGHTLY
Qty: 30 TABLET | Refills: 0 | Status: SHIPPED | OUTPATIENT
Start: 2023-12-11 | End: 2024-01-10

## 2023-12-11 RX ORDER — PREDNISONE 20 MG/1
20 TABLET ORAL DAILY
Qty: 5 TABLET | Refills: 0 | Status: SHIPPED | OUTPATIENT
Start: 2023-12-11 | End: 2023-12-16

## 2023-12-11 NOTE — PROGRESS NOTES
Patient Name: Mamadou Isaacs   : 1995  MRN: [id number]     SUBJECTIVE DATA:    CHIEF COMPLAINT:   Mamadou Isaacs is a 28 y.o. male who presents to clinic today with Sinusitis        HPI:  28-year-old male presents to the clinic to follow-up on throat tightness and also would like to discuss symptoms of sinusitis.    Sinus issue:  Patient state nasal congestion for about 3 weeks and a half and it has not getting any better with over-the-counter medications such as nasal spray and Mucinex.  Patient complain of facial pressure .  Denies any blurry vision or dizziness or headaches fever or nausea or vomiting or chest pain or body aches.  Discussed with patient to stop smoking cigarettes.  Patient agreed be referred to smoke cessation.  Discussed with patient the following medication Rx prednisone 20 mg p.o. b.i.d., take with food, discussed side effects.  Rx Zyrtec 10 mg at bedtime.  Rx Flonase 2 nasal sprays per nostril daily.  Stay hydrated with water.  Keep good hand hygiene.  Return to clinic if no improvement.  Questions solicited and answered, patient verbalized and agreed to plan.    Throat tightness:  From his last visit on 2023 patient complained of throat tightness.  Issue happens random.  Patient has history of reflux.  We had increase omeprazole 40 mg p.o. to be taken twice a day.  Patient state the symptom has improved.  But he continued to have tightness in the throat.  Discussed will refer him to ENT for evaluation.  Also discussed with patient the need to stop smoking.  Patient agreed to be referred to smoke cessation counseling.    EGD completed on 2023 by Dr. Jaramillo:  Hiatal hernia was seen.  Retroflexion view in the stomach confirmed the size and morphology of the hernia. A schatzki's ring was found in the gastroesophageal junction.  A 51 Spanish Savary type dilator was introduced for the dilation successfully.  Erythema of the mucosa was noted in the lower 3rd of the  esophagus.  These findings are compatible with social for esophagitis.  Erythema of the mucosa was noted in the antrum.  These findings are compatible with gastritis.  Normal mucosa was noted in the whole duodenum.     Continue omeprazole as directed, continue to follow GERD Diet Questions solicited and answered, patient verbalized.    Patient denies chest pain, shortness of breath, dyspnea on exertion, palpitations, peripheral edema, abdominal pain, nausea, vomiting, diarrhea, constipation, fatigue, fever, chills, dysuria,  hematuria, melena, or hematochezia.     ALLERGIES: Review of patient's allergies indicates:  No Known Allergies      ROS:  Review of Systems   HENT:  Positive for congestion and sinus pain.         Throat tightness.   Gastrointestinal:  Positive for heartburn.   All other systems reviewed and are negative.        OBJECTIVE DATA:  Vital signs  Vitals:    12/11/23 1222   BP: 128/88   Pulse: 76   Resp: 18   Temp: 97.6 °F (36.4 °C)   TempSrc: Oral   SpO2: 99%   Weight: 104.8 kg (231 lb)   Height: 6' (1.829 m)      Body mass index is 31.33 kg/m².    PHYSICAL EXAM:   Physical Exam  Vitals and nursing note reviewed.   Constitutional:       General: He is awake. He is not in acute distress.     Appearance: Normal appearance. He is well-developed, well-groomed and normal weight. He is not ill-appearing, toxic-appearing or diaphoretic.   HENT:      Head: Normocephalic and atraumatic.      Right Ear: Tympanic membrane, ear canal and external ear normal.      Left Ear: Tympanic membrane, ear canal and external ear normal.      Nose: Congestion present.      Right Turbinates: Not swollen.      Left Turbinates: Not swollen.      Right Sinus: No maxillary sinus tenderness.      Left Sinus: No maxillary sinus tenderness.      Mouth/Throat:      Mouth: Mucous membranes are moist.      Tongue: No lesions. Tongue does not deviate from midline.      Palate: No mass and lesions.      Pharynx: Oropharynx is clear.  Uvula midline. No posterior oropharyngeal erythema.      Comments: Clear Cobblestone like postnasal drip noted.  Eyes:      General: Lids are normal. No scleral icterus.     Extraocular Movements: Extraocular movements intact.      Conjunctiva/sclera: Conjunctivae normal.      Pupils: Pupils are equal, round, and reactive to light.   Neck:      Thyroid: No thyroid mass, thyromegaly or thyroid tenderness.      Trachea: Trachea and phonation normal.   Cardiovascular:      Rate and Rhythm: Normal rate and regular rhythm.      Pulses: Normal pulses.           Radial pulses are 2+ on the right side and 2+ on the left side.      Heart sounds: Normal heart sounds. No murmur heard.  Pulmonary:      Effort: Pulmonary effort is normal.      Breath sounds: Normal breath sounds and air entry. No wheezing or rhonchi.   Abdominal:      Palpations: Abdomen is soft.      Tenderness: There is no abdominal tenderness. There is no right CVA tenderness or left CVA tenderness.   Musculoskeletal:         General: Normal range of motion.      Cervical back: Full passive range of motion without pain, normal range of motion and neck supple. No rigidity or tenderness. No pain with movement.      Right lower leg: No edema.      Left lower leg: No edema.   Lymphadenopathy:      Cervical: No cervical adenopathy.   Skin:     General: Skin is warm.      Capillary Refill: Capillary refill takes less than 2 seconds.      Findings: No rash.   Neurological:      General: No focal deficit present.      Mental Status: He is alert and oriented to person, place, and time. Mental status is at baseline.      GCS: GCS eye subscore is 4. GCS verbal subscore is 5. GCS motor subscore is 6.      Cranial Nerves: No cranial nerve deficit.      Sensory: No sensory deficit.      Motor: No weakness.      Coordination: Coordination normal.      Gait: Gait normal.   Psychiatric:         Attention and Perception: Attention and perception normal.         Mood and  Affect: Mood and affect normal.         Speech: Speech normal.         Behavior: Behavior normal. Behavior is cooperative.         Thought Content: Thought content normal.         Cognition and Memory: Cognition and memory normal.         Judgment: Judgment normal.          ASSESSMENT/PLAN:  1. Acute non-recurrent maxillary sinusitis  Assessment & Plan:  Patient state nasal congestion for about 3 weeks and a half and it has not getting any better with over-the-counter medications such as nasal spray and Mucinex.  Patient complain of facial pressure .  Denies any blurry vision or dizziness or headaches fever or nausea or vomiting or chest pain or body aches.  Discussed with patient to stop smoking cigarettes.  Patient agreed be referred to smoke cessation.  Discussed with patient the following medication Rx prednisone 20 mg p.o. b.i.d., take with food, discussed side effects.  Rx Zyrtec 10 mg at bedtime.  Rx Flonase 2 nasal sprays per nostril daily.  Stay hydrated with water.  Keep good hand hygiene.  Return to clinic if no improvement.  Questions solicited and answered, patient verbalized and agreed to plan.    Orders:  -     predniSONE (DELTASONE) 20 MG tablet; Take 1 tablet (20 mg total) by mouth once daily. for 5 days  Dispense: 5 tablet; Refill: 0  -     cetirizine (ZYRTEC) 10 MG tablet; Take 1 tablet (10 mg total) by mouth every evening.  Dispense: 30 tablet; Refill: 0  -     fluticasone propionate (FLONASE) 50 mcg/actuation nasal spray; 2 sprays (100 mcg total) by Each Nostril route once daily.  Dispense: 16 g; Refill: 0    2. Throat tightness  Assessment & Plan:  Throat tightness:  From his last visit on September 8, 2023 patient complained of throat tightness.  Issue happens random.  Patient has history of reflux.  We had increase omeprazole 40 mg p.o. to be taken twice a day.  Patient state the symptom has improved.  But he continued to have tightness in the throat.  Discussed will refer him to ENT for  evaluation.  Also discussed with patient the need to stop smoking.  Patient agreed to be referred to smoke cessation counseling.    EGD completed on February 28, 2023 by Dr. Jaramillo:  Hiatal hernia was seen.  Retroflexion view in the stomach confirmed the size and morphology of the hernia. A schatzki's ring was found in the gastroesophageal junction.  A 51 Italian Savary type dilator was introduced for the dilation successfully.  Erythema of the mucosa was noted in the lower 3rd of the esophagus.  These findings are compatible with social for esophagitis.  Erythema of the mucosa was noted in the antrum.  These findings are compatible with gastritis.  Normal mucosa was noted in the whole duodenum.     Continue omeprazole as directed, continue to follow GERD Diet Questions solicited and answered, patient verbalized.    Orders:  -     Ambulatory referral/consult to ENT; Future; Expected date: 12/18/2023    3. Gastroesophageal reflux disease without esophagitis  Assessment & Plan:  Continue omeprazole 40 mg p.o. b.i.d. daily.  Continue to follow GERD diet.  Lose weight.  Keep fiber intake 30 g per day if possible.  Follow low-fat, low-cholesterol diet.  Keep appointment with Dr. Jaramillo as directed.  Questions solicited and answered, patient verbalized and agreed to plan    Orders:  -     omeprazole (PRILOSEC) 40 MG capsule; Take 1 capsule (40 mg total) by mouth 2 (two) times daily before meals.  Dispense: 60 capsule; Refill: 11    4. Tobacco dependency  -     Ambulatory referral/consult to Smoking Cessation Program; Future; Expected date: 12/18/2023           RESULTS:  No results found for this or any previous visit (from the past 1008 hour(s)).      Follow Up:  Follow up in about 3 months (around 3/11/2024).     Face to face time 45 minutes, including documentation, chart review, counseling, education, review of test results, relevant medical records, and coordination of care.   I have explained the treatment plan,  diagnosis, and prognosis to patient. All questions are answered to the best of my knowledge.     Previous medical history/lab work/radiology reviewed and considered during medical management decisions.   Medication list reviewed and medication reconciliation performed.  Patient was provided  and care about his/her current diagnosis (es) and medications including risk/benefit and side effects/adverse events, over the counter medication uses/doses, home self-care and contact precautions,  and red flags and indications for when to seek immediate medical attention.   Patient was advised to continue compliance with current medication list and medical recommendations.  Patient dvised continued compliance with recommended eating habits/ diets for medical conditions and exercise 150 minutes/ week (if possible) for medical condition (s).  Educational handouts and instructions on selected disease management in AVS (After Visit Summary).    All of the patient's questions were answered to patient's satisfaction.   The patient was receptive, expressed verbal understanding and agreement the above plan.       This note was created with the assistance of a voice recognition software or phone dictation. There may be transcription errors as a result of using this technology however minimal. Effort has been made to assure accuracy of transcription but any obvious errors or omissions should be clarified with the author of the document

## 2023-12-12 NOTE — ASSESSMENT & PLAN NOTE
Throat tightness:  From his last visit on September 8, 2023 patient complained of throat tightness.  Issue happens random.  Patient has history of reflux.  We had increase omeprazole 40 mg p.o. to be taken twice a day.  Patient state the symptom has improved.  But he continued to have tightness in the throat.  Discussed will refer him to ENT for evaluation.  Also discussed with patient the need to stop smoking.  Patient agreed to be referred to smoke cessation counseling.    EGD completed on February 28, 2023 by Dr. Jaramillo:  Hiatal hernia was seen.  Retroflexion view in the stomach confirmed the size and morphology of the hernia. A schatzki's ring was found in the gastroesophageal junction.  A 51 Burmese Savary type dilator was introduced for the dilation successfully.  Erythema of the mucosa was noted in the lower 3rd of the esophagus.  These findings are compatible with social for esophagitis.  Erythema of the mucosa was noted in the antrum.  These findings are compatible with gastritis.  Normal mucosa was noted in the whole duodenum.     Continue omeprazole as directed, continue to follow GERD Diet Questions solicited and answered, patient verbalized.

## 2023-12-12 NOTE — ASSESSMENT & PLAN NOTE
Continue omeprazole 40 mg p.o. b.i.d. daily.  Continue to follow GERD diet.  Lose weight.  Keep fiber intake 30 g per day if possible.  Follow low-fat, low-cholesterol diet.  Keep appointment with Dr. Jaramillo as directed.  Questions solicited and answered, patient verbalized and agreed to plan

## 2023-12-12 NOTE — ASSESSMENT & PLAN NOTE
Patient state nasal congestion for about 3 weeks and a half and it has not getting any better with over-the-counter medications such as nasal spray and Mucinex.  Patient complain of facial pressure .  Denies any blurry vision or dizziness or headaches fever or nausea or vomiting or chest pain or body aches.  Discussed with patient to stop smoking cigarettes.  Patient agreed be referred to smoke cessation.  Discussed with patient the following medication Rx prednisone 20 mg p.o. b.i.d., take with food, discussed side effects.  Rx Zyrtec 10 mg at bedtime.  Rx Flonase 2 nasal sprays per nostril daily.  Stay hydrated with water.  Keep good hand hygiene.  Return to clinic if no improvement.  Questions solicited and answered, patient verbalized and agreed to plan.

## 2024-01-08 DIAGNOSIS — F41.0 PANIC DISORDER: Primary | ICD-10-CM

## 2024-01-08 RX ORDER — CLONAZEPAM 1 MG/1
1 TABLET ORAL DAILY PRN
Qty: 30 TABLET | Refills: 2 | Status: SHIPPED | OUTPATIENT
Start: 2024-01-08 | End: 2025-01-07

## 2024-01-08 NOTE — PROGRESS NOTES
Received refill request for klonopin.  PDMP reviewed and demonstrated no abnormal filling patterns.  Refill submitted as requested.  Will follow up with patient as currently scheduled.

## 2024-01-26 ENCOUNTER — OFFICE VISIT (OUTPATIENT)
Dept: FAMILY MEDICINE | Facility: CLINIC | Age: 29
End: 2024-01-26
Payer: MEDICAID

## 2024-01-26 VITALS
BODY MASS INDEX: 31.29 KG/M2 | RESPIRATION RATE: 18 BRPM | SYSTOLIC BLOOD PRESSURE: 129 MMHG | OXYGEN SATURATION: 100 % | HEART RATE: 98 BPM | HEIGHT: 72 IN | WEIGHT: 231 LBS | TEMPERATURE: 98 F | DIASTOLIC BLOOD PRESSURE: 89 MMHG

## 2024-01-26 DIAGNOSIS — L70.0 CYSTIC ACNE: ICD-10-CM

## 2024-01-26 DIAGNOSIS — R21 RASH: ICD-10-CM

## 2024-01-26 DIAGNOSIS — L73.2 ACNE INVERSA: Primary | ICD-10-CM

## 2024-01-26 PROCEDURE — 99214 OFFICE O/P EST MOD 30 MIN: CPT | Mod: PBBFAC | Performed by: FAMILY MEDICINE

## 2024-01-26 RX ORDER — DOXYCYCLINE 100 MG/1
100 CAPSULE ORAL 2 TIMES DAILY
Qty: 28 CAPSULE | Refills: 0 | Status: SHIPPED | OUTPATIENT
Start: 2024-01-26 | End: 2024-01-26

## 2024-01-26 RX ORDER — DOXYCYCLINE 100 MG/1
100 CAPSULE ORAL 2 TIMES DAILY
Qty: 28 CAPSULE | Refills: 2 | Status: SHIPPED | OUTPATIENT
Start: 2024-01-26 | End: 2024-02-23 | Stop reason: SDUPTHER

## 2024-01-26 RX ORDER — CLINDAMYCIN PHOSPHATE 11.9 MG/ML
SOLUTION TOPICAL 2 TIMES DAILY
Qty: 120 ML | Refills: 1 | Status: SHIPPED | OUTPATIENT
Start: 2024-01-26 | End: 2024-02-23 | Stop reason: SDUPTHER

## 2024-01-26 NOTE — PROGRESS NOTES
Subjective     Patient ID: Mamadou Isaasc is a 28 y.o. male.    Chief Complaint: Acne (On face, neck, back, and in between legs.)    HPI  27 yo male present to clinic due to acne on back, chest, groin. Started when he was a teenager. Has tried several creams without relief. Reports lesions are mostly painful. Occasionally drain.   Denied testosterone use.  Has a PMH of anxiety, panic disorder per chart review    Review of Systems  Per HPI       Objective     Physical Exam  Vitals:    01/26/24 1031   BP: 129/89   Pulse: 98   Resp: 18   Temp: 97.7 °F (36.5 °C)   Gen NAD AXO  Integumentary: multiple raised, cystic comedones, on chest, neck, back, very few lesions on face  Groin not examined       Assessment and Plan     1. Acne inversa    2. Rash  -     Ambulatory referral/consult to Dermatology    3. Cystic acne        Will start with doxycycline 100mg BID x 14 days then for flares  Clindamycin solution, benzoyl peroxide wash sent to pharmacy  Will set up with next available derm        Follow up in about 4 weeks (around 2/23/2024) for dermatology.

## 2024-02-05 LAB — MAYO GENERIC ORDERABLE RESULT: NORMAL

## 2024-02-23 ENCOUNTER — OFFICE VISIT (OUTPATIENT)
Dept: DERMATOLOGY | Facility: CLINIC | Age: 29
End: 2024-02-23
Payer: MEDICAID

## 2024-02-23 VITALS
SYSTOLIC BLOOD PRESSURE: 130 MMHG | TEMPERATURE: 98 F | HEART RATE: 87 BPM | BODY MASS INDEX: 30.99 KG/M2 | HEIGHT: 72 IN | OXYGEN SATURATION: 100 % | RESPIRATION RATE: 20 BRPM | DIASTOLIC BLOOD PRESSURE: 85 MMHG | WEIGHT: 228.81 LBS

## 2024-02-23 DIAGNOSIS — L73.2 HIDRADENITIS SUPPURATIVA: Primary | ICD-10-CM

## 2024-02-23 DIAGNOSIS — L70.9 ACNE, UNSPECIFIED ACNE TYPE: ICD-10-CM

## 2024-02-23 PROCEDURE — 99213 OFFICE O/P EST LOW 20 MIN: CPT | Mod: PBBFAC | Performed by: DERMATOLOGY

## 2024-02-23 RX ORDER — DOXYCYCLINE 100 MG/1
100 CAPSULE ORAL 2 TIMES DAILY PRN
Qty: 60 CAPSULE | Refills: 3 | Status: SHIPPED | OUTPATIENT
Start: 2024-02-23

## 2024-02-23 RX ORDER — CLINDAMYCIN PHOSPHATE 11.9 MG/ML
SOLUTION TOPICAL 2 TIMES DAILY
Qty: 120 ML | Refills: 5 | Status: SHIPPED | OUTPATIENT
Start: 2024-02-23

## 2024-02-23 NOTE — PROGRESS NOTES
[facility] Dermatology Clinic Visit    Chief Complaint:      Referred from [facility] [place]    Subjective:     HPI:  Mamadou Isaacs is a 29 y.o. male with a past medical history of anxiety.  He presents to Dermatology clinic today as a new patient due to acne.  Patient reports acne on his back, face, chest, groin, neck which started since he was a teenager.  He reports pain and occasional drainage from the areas.  He was recently prescribed doxycycline 100 mg b.i.d. x 14 days, clindamycin solution and benzoyl peroxide wash which he reports that he has been adherent to with moderate improvement.    Patient is a current smoker.  He has smoked half pack daily x 10 years.    Review of Systems  Please see above for pertinent positives and negatives.     Objective:   Last 24 Hour Vital Signs:       Physical Examination:  General: AAOx3. No acute distress  Psychiatric: Mood and affect normal  Pulmonary: Bilateral symmetric chest rise. Non-labored.  Skin: Exposed skin is warm & dry.  Multiple cystic comedones on neck, chest, back.  Few comedones on face  Neuro: Patient moves all extremities equally.     Assessment & Plan:     Acne  Possible Hidradenitis supprativa  - suspect the patient has a component of HS due to widespread lesions also present in groin area. Patient also reports frequent boils in underarm.   This was discussed with the patient as well as treatment options.  He states that he would like to continue current therapy with doxycycline, clindamycin solution and benzyl peroxide wash.  New prescription for doxycycline sent (100 mg b.i.d. for 2-4 weeks with flares), refills sent for topical solutions.   - Encouraged smoking cessation  - consider Humira or Dapsone at next visit      Follow-ups  6 months       Carla Watson MD  [facility] Family Medicine Resident, JOSE [facility]FA[facility]

## 2024-02-28 ENCOUNTER — OFFICE VISIT (OUTPATIENT)
Dept: OTOLARYNGOLOGY | Facility: CLINIC | Age: 29
End: 2024-02-28
Payer: MEDICAID

## 2024-02-28 VITALS
WEIGHT: 229.63 LBS | SYSTOLIC BLOOD PRESSURE: 144 MMHG | OXYGEN SATURATION: 99 % | BODY MASS INDEX: 31.1 KG/M2 | DIASTOLIC BLOOD PRESSURE: 83 MMHG | RESPIRATION RATE: 20 BRPM | HEART RATE: 85 BPM | TEMPERATURE: 97 F | HEIGHT: 72 IN

## 2024-02-28 DIAGNOSIS — K21.9 LARYNGOPHARYNGEAL REFLUX (LPR): Primary | ICD-10-CM

## 2024-02-28 DIAGNOSIS — F17.200 TOBACCO DEPENDENCY: ICD-10-CM

## 2024-02-28 DIAGNOSIS — R13.19 OTHER DYSPHAGIA: ICD-10-CM

## 2024-02-28 DIAGNOSIS — R09.89 THROAT TIGHTNESS: ICD-10-CM

## 2024-02-28 PROCEDURE — 99215 OFFICE O/P EST HI 40 MIN: CPT | Mod: PBBFAC | Performed by: STUDENT IN AN ORGANIZED HEALTH CARE EDUCATION/TRAINING PROGRAM

## 2024-02-28 PROCEDURE — 31575 DIAGNOSTIC LARYNGOSCOPY: CPT | Mod: PBBFAC | Performed by: STUDENT IN AN ORGANIZED HEALTH CARE EDUCATION/TRAINING PROGRAM

## 2024-02-28 NOTE — PROGRESS NOTES
The scope used for the exam was:  Flexible scope ENF-P4  Serial Number:  1)    [id number]    []   2)    [id number]    []   3)    [id number]    []   4)    [id number]    [x]   5)    [id number]    []   6)    [id number]    []       The scope used for the exam was:  Rigid scope   Serial Number:  1)   [id number]    []   2)   [id number]    []   3)   7330    []   4)    3384   []   5)    0824   []   6)    5554   []     7)   7425    []   8)   2240    []   9)   1109    []

## 2024-02-28 NOTE — PROGRESS NOTES
[facility]  Otolaryngology-Head & Neck Surgery    Office Visit    Patient Name: Mamadou Isaacs  MRN: [id number]  YOB: 1995  Date of Encounter: 02/28/2024  Physician: Chung Vargas MD  Medical Student: Tito Dillard MS3      CC: Throat tightness for 7 months    HPI: Mamadou Isaacs is a 29 y.o. male w/ PMHx of panic disorder and acid reflux on omeprazole 40mg b.I.d. c/o 7x months of intermittent throat tightness. It does not have any triggers and resolves on its own. He does not have any issues breathing but says that occasionally makes him nervous. He also has some associated dysphagia. Symptoms worse with certain foods (caffeine, acidic and spicy foods). Seen by Dr. Jaramillo in [place] last year for esophageal dilation. Patient states procedure improved symptoms. No allergies and has never had an anaphylactic reaction or episode of angioedema to his knowledge. Consumes 12x beers/week and has a 6 pack year history. Maternal grandfather had throat cancer. Patient denies ear pain, hoarseness, h/o asthma and sinus problems.     Review of patient's allergies indicates:  No Known Allergies    Past Medical History:   Diagnosis Date    GERD (gastroesophageal reflux disease)        Past Surgical History:   Procedure Laterality Date    WISDOM TOOTH EXTRACTION         Social History     Socioeconomic History    Marital status: Single   Tobacco Use    Smoking status: Every Day     Current packs/day: 0.50     Average packs/day: 0.5 packs/day for 8.0 years (4.0 ttl pk-yrs)     Types: Cigarettes     Passive exposure: Never    Smokeless tobacco: Never   Substance and Sexual Activity    Alcohol use: Yes     Alcohol/week: 12.0 standard drinks of alcohol     Types: 12 Cans of beer per week    Drug use: Not Currently     Types: Benzodiazepines, Marijuana     Comment: past drug use.  Heroin.  Oxycodone.    Sexual activity: Yes     Partners: Female     Social Determinants of Health      Financial Resource Strain: Low Risk  (6/27/2023)    Overall Financial Resource Strain (CARDIA)     Difficulty of Paying Living Expenses: Not hard at all   Food Insecurity: No Food Insecurity (6/27/2023)    Hunger Vital Sign     Worried About Running Out of Food in the Last Year: Never true     Ran Out of Food in the Last Year: Never true   Transportation Needs: No Transportation Needs (6/27/2023)    PRAPARE - Transportation     Lack of Transportation (Medical): No     Lack of Transportation (Non-Medical): No   Physical Activity: Insufficiently Active (6/27/2023)    Exercise Vital Sign     Days of Exercise per Week: 2 days     Minutes of Exercise per Session: 20 min   Stress: No Stress Concern Present (6/27/2023)    [employer] [place] [employer] - Occupational Stress Questionnaire     Feeling of Stress : Only a little   Social Connections: Moderately Integrated (6/27/2023)    Social Connection and Isolation Panel [NHANES]     Frequency of Communication with Friends and Family: Twice a week     Frequency of Social Gatherings with Friends and Family: Twice a week     Attends [employer] Services: 1 to 4 times per year     Active Member of Clubs or Organizations: No     Attends Club or Organization Meetings: 1 to 4 times per year     Marital Status: Never    Housing Stability: Low Risk  (6/27/2023)    Housing Stability Vital Sign     Unable to Pay for Housing in the Last Year: No     Number of Places Lived in the Last Year: 1     Unstable Housing in the Last Year: No       Family History   Problem Relation Age of Onset    Depression Mother     Hyperlipidemia Mother     Hypertension Mother     Hypertension Father     Diabetes Father     Hyperlipidemia Father     Atrial fibrillation Maternal Grandmother     Depression Maternal Grandmother     Cancer Maternal Grandfather     Diabetes Paternal Grandfather        Outpatient Encounter Medications as of 2/28/2024   Medication Sig Dispense Refill     clindamycin (CLEOCIN T) 1 % external solution Apply topically 2 (two) times daily. Apply daily qam 120 mL 5    clonazePAM (KLONOPIN) 1 MG tablet Take 1 tablet (1 mg total) by mouth daily as needed for Anxiety. 30 tablet 2    diclofenac (VOLTAREN) 50 MG EC tablet Take 1 tablet (50 mg total) by mouth 3 (three) times daily as needed (pain/headache). 30 tablet 1    doxycycline (VIBRAMYCIN) 100 MG Cap Take 1 capsule (100 mg total) by mouth 2 (two) times daily as needed (Take for 2-4 weeks as needed for flares). 60 capsule 3    fluticasone propionate (FLONASE) 50 mcg/actuation nasal spray 2 sprays (100 mcg total) by Each Nostril route once daily. 16 g 0    mupirocin (BACTROBAN) 2 % ointment Apply to affected area 3 times daily for 7 days. And then as needed. 22 g 1    omeprazole (PRILOSEC) 40 MG capsule Take 1 capsule (40 mg total) by mouth 2 (two) times daily before meals. 60 capsule 11    benzoyl peroxide 4.4 % ClER Apply daily to affected areas qpm 120 mL 5    cetirizine (ZYRTEC) 10 MG tablet Take 1 tablet (10 mg total) by mouth every evening. 30 tablet 0    [DISCONTINUED] benzoyl peroxide 4.4 % ClER Apply daily to affected areas qpm 120 mL 1    [DISCONTINUED] clindamycin (CLEOCIN T) 1 % external solution Apply topically 2 (two) times daily. Apply daily qam 120 mL 1    [DISCONTINUED] doxycycline (VIBRAMYCIN) 100 MG Cap Take 1 capsule (100 mg total) by mouth 2 (two) times daily. 28 capsule 2     No facility-administered encounter medications on file as of 2/28/2024.       PHYSICAL EXAM:  Vitals:    02/28/24 0903   BP: (!) 144/83   Pulse: 85   Resp:    Temp:        General Appearance: well nourished, well-developed, alert, oriented, in no acute distress, no dysphonia  Head/Face: Normocephalic, atraumatic  Eyes: EOMI, normal conjunctiva  Ears: Hears well at normal conversation volume  AD: external normal, ear canal normal, TM intact without effusion or retraction  AS: external normal, ear canal normal, TM intact without  effusion or retraction  Nose: External nose normal, septum midline, mild inferior turbinate hypertrophy, no epistaxis  Oral Cavity & Oropharynx: Lips normal. Tongue without masses or lesions. Dentition appropriate for age. Oropharynx with some moderate cobblestoning but otherwise unremarkable. No masses, lesions, or leukoplakia. Floor of mouth and base of tongue are soft.   Neck: Soft, non-tender, no palpable lymph nodes. Thyroid without nodules or goiter.   Respiratory: Nonlabored breathing on room air. No stridor or stertor.   Neuro: CN II - XII intact  Psychiatric: oriented to time, place and person, no depression, anxiety or agitation    PROCEDURE: Flexible fiberoptic laryngoscopy    Surgeon: Chung Vargas   Anesthesia: None  Complications: None  Date: 02/28/2024  Indication: Dysphagia    Procedure in Detail:    Informed consent was obtained from the patient after explanation of procedure, indications, risks and benefits. Flexible laryngoscopy was performed on [facility] through the right nasal passage(s). The nasal cavity, nasopharynx, oropharynx, hypopharynx and larynx were adequately visualized. The true vocal cords and arytenoids were examined during phonation and repose.    Operative Findings:    Nasal Cavity: right nasal airway patent without lesions or ulcerations  Nasopharynx: No adenoid hypertrophy, no masses or ulcerations noted in the fossae of Rosenmüller, patent appearing mitch tubarii  Tongue Base: No fullness or lingual tonsillar hypertrophy. No masses.  Pharyngeal Walls: No lesions or ulcerations noted. Some moderate posterior pharyngeal edema.   Epiglottis / Aryepiglottic Folds: Crisp epiglottis without lesions, normal-appearing aryepiglottic folds without lesions.   Arytenoids: Full symmetric range of motion bilaterally, no lesions but slightly erythematous bilaterally  Piriform Sinus: No lesions or masses noted  Vocal Cords: Symmetric movement bilaterally with good glottic closure, patent  airway during inspiration and expiration, no lesions or masses  Post-cricoid: edematous and erythematous       The patient tolerated the procedure well without any complications.    Impression: Edema and erythema consistent with GERD         PERTINENT DATA:  N/A      ASSESSMENT:  Mamadou Isaacs is a 29 y.o. male with PMHx 7 months of throat tightness and dysphagia. His symptoms are most likely related to GERD/laryngopharyngeal reflux given history and findings on flexible nasolaryngoscopy today. They could also be allergic in nature but he does not have any evidence of allergies on history or examination. Finally, this could be anxiety related but again lower on the differential. He has been previously treated with PPI.     PLAN:  -- Discussed lifestyle modifications for GERD including dietary changes, allotting sufficient time to pass in between last meal and reclining, diet/exercise, tobacco cessation, caffeine cessation.   -- I recommended that he try alginate therapy and gave him samples of reflux gourmet today to help counter the non-acidic components of DORI (e.g. pepsin, bile salts, etc).    -- Counseled on tobacco cessation    RTC 3 months to follow up on symptoms    Tito Dillard, MS3    Chung Vargas MD  [facility] Otolaryngology PGY-4  02/28/2024 8:28 PM

## 2024-03-11 ENCOUNTER — OFFICE VISIT (OUTPATIENT)
Dept: FAMILY MEDICINE | Facility: CLINIC | Age: 29
End: 2024-03-11
Payer: MEDICAID

## 2024-03-11 VITALS
HEIGHT: 72 IN | SYSTOLIC BLOOD PRESSURE: 129 MMHG | TEMPERATURE: 98 F | BODY MASS INDEX: 31.69 KG/M2 | WEIGHT: 234 LBS | HEART RATE: 97 BPM | RESPIRATION RATE: 18 BRPM | DIASTOLIC BLOOD PRESSURE: 89 MMHG | OXYGEN SATURATION: 100 %

## 2024-03-11 DIAGNOSIS — Z00.00 ANNUAL PHYSICAL EXAM: Primary | ICD-10-CM

## 2024-03-11 PROBLEM — J01.00 ACUTE NON-RECURRENT MAXILLARY SINUSITIS: Status: RESOLVED | Noted: 2023-12-11 | Resolved: 2024-03-11

## 2024-03-11 LAB
ALBUMIN SERPL-MCNC: 4 G/DL (ref 3.5–5)
ALBUMIN/GLOB SERPL: 1 RATIO (ref 1.1–2)
ALP SERPL-CCNC: 103 UNIT/L (ref 40–150)
ALT SERPL-CCNC: 33 UNIT/L (ref 0–55)
APPEARANCE UR: CLEAR
AST SERPL-CCNC: 27 UNIT/L (ref 5–34)
BACTERIA #/AREA URNS AUTO: NORMAL /HPF
BASOPHILS # BLD AUTO: 0.05 X10(3)/MCL
BASOPHILS NFR BLD AUTO: 0.5 %
BILIRUB SERPL-MCNC: 0.8 MG/DL
BILIRUB UR QL STRIP.AUTO: NEGATIVE
BUN SERPL-MCNC: 8.5 MG/DL (ref 8.9–20.6)
CALCIUM SERPL-MCNC: 10 MG/DL (ref 8.4–10.2)
CHLORIDE SERPL-SCNC: 102 MMOL/L (ref 98–107)
CO2 SERPL-SCNC: 27 MMOL/L (ref 22–29)
COLOR UR AUTO: NORMAL
CREAT SERPL-MCNC: 0.79 MG/DL (ref 0.73–1.18)
EOSINOPHIL # BLD AUTO: 0.12 X10(3)/MCL (ref 0–0.9)
EOSINOPHIL NFR BLD AUTO: 1.3 %
ERYTHROCYTE [DISTWIDTH] IN BLOOD BY AUTOMATED COUNT: 13.5 % (ref 11.5–17)
GFR SERPLBLD CREATININE-BSD FMLA CKD-EPI: >60 MLS/MIN/1.73/M2
GLOBULIN SER-MCNC: 4.2 GM/DL (ref 2.4–3.5)
GLUCOSE SERPL-MCNC: 83 MG/DL (ref 74–100)
GLUCOSE UR QL STRIP.AUTO: NORMAL
HCT VFR BLD AUTO: 50.8 % (ref 42–52)
HGB BLD-MCNC: 16.5 G/DL (ref 14–18)
HYALINE CASTS #/AREA URNS LPF: NORMAL /LPF
IMM GRANULOCYTES # BLD AUTO: 0.06 X10(3)/MCL (ref 0–0.04)
IMM GRANULOCYTES NFR BLD AUTO: 0.7 %
KETONES UR QL STRIP.AUTO: NEGATIVE
LEUKOCYTE ESTERASE UR QL STRIP.AUTO: NEGATIVE
LYMPHOCYTES # BLD AUTO: 2.76 X10(3)/MCL (ref 0.6–4.6)
LYMPHOCYTES NFR BLD AUTO: 30 %
MCH RBC QN AUTO: 28.5 PG (ref 27–31)
MCHC RBC AUTO-ENTMCNC: 32.5 G/DL (ref 33–36)
MCV RBC AUTO: 87.7 FL (ref 80–94)
MONOCYTES # BLD AUTO: 0.68 X10(3)/MCL (ref 0.1–1.3)
MONOCYTES NFR BLD AUTO: 7.4 %
NEUTROPHILS # BLD AUTO: 5.52 X10(3)/MCL (ref 2.1–9.2)
NEUTROPHILS NFR BLD AUTO: 60.1 %
NITRITE UR QL STRIP.AUTO: NEGATIVE
NRBC BLD AUTO-RTO: 0 %
PH UR STRIP.AUTO: 6.5 [PH]
PLATELET # BLD AUTO: 241 X10(3)/MCL (ref 130–400)
PMV BLD AUTO: 10.8 FL (ref 7.4–10.4)
POTASSIUM SERPL-SCNC: 4.4 MMOL/L (ref 3.5–5.1)
PROT SERPL-MCNC: 8.2 GM/DL (ref 6.4–8.3)
PROT UR QL STRIP.AUTO: NEGATIVE
RBC # BLD AUTO: 5.79 X10(6)/MCL (ref 4.7–6.1)
RBC #/AREA URNS AUTO: NORMAL /HPF
RBC UR QL AUTO: NEGATIVE
SODIUM SERPL-SCNC: 137 MMOL/L (ref 136–145)
SP GR UR STRIP.AUTO: 1.01 (ref 1–1.03)
SQUAMOUS #/AREA URNS LPF: NORMAL /HPF
UROBILINOGEN UR STRIP-ACNC: NORMAL
WBC # SPEC AUTO: 9.19 X10(3)/MCL (ref 4.5–11.5)
WBC #/AREA URNS AUTO: NORMAL /HPF

## 2024-03-11 PROCEDURE — 3074F SYST BP LT 130 MM HG: CPT | Mod: CPTII,,, | Performed by: NURSE PRACTITIONER

## 2024-03-11 PROCEDURE — 85025 COMPLETE CBC W/AUTO DIFF WBC: CPT | Performed by: NURSE PRACTITIONER

## 2024-03-11 PROCEDURE — 81001 URINALYSIS AUTO W/SCOPE: CPT | Performed by: NURSE PRACTITIONER

## 2024-03-11 PROCEDURE — 1160F RVW MEDS BY RX/DR IN RCRD: CPT | Mod: CPTII,,, | Performed by: NURSE PRACTITIONER

## 2024-03-11 PROCEDURE — 3008F BODY MASS INDEX DOCD: CPT | Mod: CPTII,,, | Performed by: NURSE PRACTITIONER

## 2024-03-11 PROCEDURE — 1159F MED LIST DOCD IN RCRD: CPT | Mod: CPTII,,, | Performed by: NURSE PRACTITIONER

## 2024-03-11 PROCEDURE — 3079F DIAST BP 80-89 MM HG: CPT | Mod: CPTII,,, | Performed by: NURSE PRACTITIONER

## 2024-03-11 PROCEDURE — 99214 OFFICE O/P EST MOD 30 MIN: CPT | Mod: PBBFAC | Performed by: NURSE PRACTITIONER

## 2024-03-11 PROCEDURE — 36415 COLL VENOUS BLD VENIPUNCTURE: CPT | Performed by: NURSE PRACTITIONER

## 2024-03-11 PROCEDURE — 80053 COMPREHEN METABOLIC PANEL: CPT | Performed by: NURSE PRACTITIONER

## 2024-03-11 PROCEDURE — 99395 PREV VISIT EST AGE 18-39: CPT | Mod: S$PBB,,, | Performed by: NURSE PRACTITIONER

## 2024-03-11 NOTE — PATIENT INSTRUCTIONS
Isaiah Cedillo,     If you are due for any health screening(s) below please notify me so we can arrange them to be ordered and scheduled. Most healthy patients at your age complete them, but you are free to accept or refuse.     If you can't do it, I'll definitely understand. If you can, I'd certainly appreciate it!    Tests to Keep You Healthy    Tobacco Cessation: NO      Were here to help you quit smoking     Our records indicated that you are still smoking. One of the best things you can do for your health is to stop smoking and we are here to help.     Talk with your provider about our Smoking Cessation Program and how we can support you on your journey.

## 2024-03-11 NOTE — PROGRESS NOTES
Patient Name: Mamadou Isaacs   : 1995  MRN: [id number]     SUBJECTIVE DATA:    CHIEF COMPLAINT:   Mamadou Isaacs is a 29 y.o. male who presents to clinic today with Annual Exam        HPI: Mamadou Isaacs  29-year-old male presents to the clinic to complete yearly wellness exam.  Denies any complaints at this time.    Social Determinants of Health     Tobacco Use: High Risk (3/11/2024)    Patient History     Smoking Tobacco Use: Every Day     Smokeless Tobacco Use: Never     Passive Exposure: Never   Alcohol Use: Not At Risk (2023)    AUDIT-C     Frequency of Alcohol Consumption: Monthly or less     Average Number of Drinks: 1 or 2     Frequency of Binge Drinking: Less than monthly   Financial Resource Strain: Low Risk  (2023)    Overall Financial Resource Strain (CARDIA)     Difficulty of Paying Living Expenses: Not hard at all   Food Insecurity: No Food Insecurity (2023)    Hunger Vital Sign     Worried About Running Out of Food in the Last Year: Never true     Ran Out of Food in the Last Year: Never true   Transportation Needs: No Transportation Needs (2023)    PRAPARE - Transportation     Lack of Transportation (Medical): No     Lack of Transportation (Non-Medical): No   Physical Activity: Insufficiently Active (2023)    Exercise Vital Sign     Days of Exercise per Week: 2 days     Minutes of Exercise per Session: 20 min   Stress: No Stress Concern Present (2023)    [employer] [employer] - Occupational Stress Questionnaire     Feeling of Stress : Only a little   Social Connections: Moderately Integrated (2023)    Social Connection and Isolation Panel [NHANES]     Frequency of Communication with Friends and Family: Twice a week     Frequency of Social Gatherings with Friends and Family: Twice a week     Attends Congregational Services: 1 to 4 times per year     Active Member of Clubs or Organizations: No     Attends Club or Organization Meetings: 1 to 4 times  per year     Marital Status: Never    Housing Stability: Low Risk  (6/27/2023)    Housing Stability Vital Sign     Unable to Pay for Housing in the Last Year: No     Number of Places Lived in the Last Year: 1     Unstable Housing in the Last Year: No   Depression: Low Risk  (3/11/2024)    Depression     Last PHQ-4: Flowsheet Data: 0     Car safety:  Seat belt used all the time.  Water:  Stay hydrated with fluids, drink 6-8 cups of water daily.  Tobacco use:  Patient not ready to quit.  Patient state he is decreasing his cigarette smoking.  Instructed patient when ready we can refer him to smoke cessation counseling.  Patient verbalized.  Alcohol:  Drink in moderation.  Exercise: exercise 30 minutes a day up to 5 days a week.  Stay active.  Lose weight.  Nutrition:  Follow low-cholesterol, low-fat, low-salt diet.  Eat fresh fruits and vegetables.  Keep in mind portion size.  Dental:  Patient does follow-up with a dentist yearly.  Ophthalmology:  Patient does follow-up with ophthalmologist yearly.  Immunizations:  Up-to-date.  Lab blood work drawn today will notify of test results when they become available.     Patient denies chest pain, shortness of breath, dyspnea on exertion, palpitations, peripheral edema, abdominal pain, nausea, vomiting, diarrhea, constipation, fatigue, fever, chills, dysuria,  hematuria, melena, or hematochezia.        ALLERGIES: Review of patient's allergies indicates:  No Known Allergies      ROS:  Review of Systems   All other systems reviewed and are negative.        OBJECTIVE DATA:  Vital signs  Vitals:    03/11/24 0922   BP: 129/89   Pulse: 97   Resp: 18   Temp: 97.8 °F (36.6 °C)   TempSrc: Oral   SpO2: 100%   Weight: 106.1 kg (234 lb)   Height: 6' (1.829 m)      Body mass index is 31.74 kg/m².    PHYSICAL EXAM:   Physical Exam  Constitutional:       General: He is awake. He is not in acute distress.     Appearance: Normal appearance. He is well-developed and well-groomed. He is  obese. He is not ill-appearing, toxic-appearing or diaphoretic.   HENT:      Head: Normocephalic and atraumatic.      Right Ear: Tympanic membrane, ear canal and external ear normal.      Left Ear: Tympanic membrane, ear canal and external ear normal.      Nose: Nose normal.      Mouth/Throat:      Mouth: Mucous membranes are moist.      Tongue: No lesions. Tongue does not deviate from midline.      Palate: No mass and lesions.      Pharynx: Oropharynx is clear. Uvula midline.   Eyes:      General: Lids are normal. Gaze aligned appropriately. No scleral icterus.     Extraocular Movements: Extraocular movements intact.      Conjunctiva/sclera: Conjunctivae normal.      Pupils: Pupils are equal, round, and reactive to light.   Neck:      Thyroid: No thyroid mass, thyromegaly or thyroid tenderness.      Trachea: Trachea and phonation normal.   Cardiovascular:      Rate and Rhythm: Normal rate and regular rhythm.      Pulses: Normal pulses.           Carotid pulses are 2+ on the right side and 2+ on the left side.       Radial pulses are 2+ on the right side.        Femoral pulses are 2+ on the left side.       Dorsalis pedis pulses are 2+ on the right side and 2+ on the left side.        Posterior tibial pulses are 2+ on the right side and 2+ on the left side.      Heart sounds: Normal heart sounds. No murmur heard.  Pulmonary:      Effort: Pulmonary effort is normal.      Breath sounds: Normal breath sounds and air entry. No wheezing or rhonchi.   Abdominal:      General: Bowel sounds are normal. There is no distension.      Palpations: Abdomen is soft. There is no mass.      Tenderness: There is no abdominal tenderness. There is no right CVA tenderness, left CVA tenderness, guarding or rebound.      Hernia: No hernia is present. There is no hernia in the left inguinal area or right inguinal area.   Genitourinary:     Penis: Normal.       Testes: Normal. Cremasteric reflex is present.      Epididymis:      Right:  Normal.      Left: Normal.   Musculoskeletal:         General: Normal range of motion.      Cervical back: Normal range of motion and neck supple. No rigidity or tenderness.      Right lower leg: No edema.      Left lower leg: No edema.   Lymphadenopathy:      Cervical: No cervical adenopathy.      Lower Body: No right inguinal adenopathy. No left inguinal adenopathy.   Skin:     General: Skin is warm.      Capillary Refill: Capillary refill takes less than 2 seconds.      Findings: No rash.   Neurological:      General: No focal deficit present.      Mental Status: He is alert and oriented to person, place, and time. Mental status is at baseline.      GCS: GCS eye subscore is 4. GCS verbal subscore is 5. GCS motor subscore is 6.      Cranial Nerves: Cranial nerves 2-12 are intact. No cranial nerve deficit.      Sensory: Sensation is intact. No sensory deficit.      Motor: Motor function is intact. No weakness.      Coordination: Coordination is intact. Coordination normal.      Gait: Gait is intact. Gait normal.   Psychiatric:         Attention and Perception: Attention and perception normal.         Mood and Affect: Mood and affect normal.         Behavior: Behavior normal. Behavior is cooperative.         Thought Content: Thought content normal.         Cognition and Memory: Cognition and memory normal.         Judgment: Judgment normal.          ASSESSMENT/PLAN:  1. Annual physical exam  -     CBC Auto Differential  -     Comprehensive Metabolic Panel  -     Urinalysis, Reflex to Urine Culture           RESULTS:  Recent Results (from the past 1008 hour(s))   Comprehensive Metabolic Panel    Collection Time: 03/11/24  9:46 AM   Result Value Ref Range    Sodium Level 137 136 - 145 mmol/L    Potassium Level 4.4 3.5 - 5.1 mmol/L    Chloride 102 98 - 107 mmol/L    Carbon Dioxide 27 22 - 29 mmol/L    Glucose Level 83 74 - 100 mg/dL    Blood Urea Nitrogen 8.5 (L) 8.9 - 20.6 mg/dL    Creatinine 0.79 0.73 - 1.18 mg/dL     Calcium Level Total 10.0 8.4 - 10.2 mg/dL    Protein Total 8.2 6.4 - 8.3 gm/dL    Albumin Level 4.0 3.5 - 5.0 g/dL    Globulin 4.2 (H) 2.4 - 3.5 gm/dL    Albumin/Globulin Ratio 1.0 (L) 1.1 - 2.0 ratio    Bilirubin Total 0.8 <=1.5 mg/dL    Alkaline Phosphatase 103 40 - 150 unit/L    Alanine Aminotransferase 33 0 - 55 unit/L    Aspartate Aminotransferase 27 5 - 34 unit/L    eGFR >60 mls/min/1.73/m2   Urinalysis, Reflex to Urine Culture    Collection Time: 03/11/24  9:46 AM    Specimen: Urine   Result Value Ref Range    Color, UA Light-Yellow Yellow, Light-Yellow, Dark Yellow, Iza, Straw    Appearance, UA Clear Clear    Specific Gravity, UA 1.009 1.005 - 1.030    pH, UA 6.5 5.0 - 8.5    Protein, UA Negative Negative    Glucose, UA Normal Negative, Normal    Ketones, UA Negative Negative    Blood, UA Negative Negative    Bilirubin, UA Negative Negative    Urobilinogen, UA Normal 0.2, 1.0, Normal    Nitrites, UA Negative Negative    Leukocyte Esterase, UA Negative Negative    WBC, UA 0-5 None Seen, 0-2, 3-5, 0-5 /HPF    Bacteria, UA None Seen None Seen /HPF    Squamous Epithelial Cells, UA None Seen None Seen /HPF    Hyaline Casts, UA None Seen None Seen /lpf    RBC, UA None Seen None Seen, 0-2, 3-5, 0-5 /HPF   CBC with Differential    Collection Time: 03/11/24  9:46 AM   Result Value Ref Range    WBC 9.19 4.50 - 11.50 x10(3)/mcL    RBC 5.79 4.70 - 6.10 x10(6)/mcL    Hgb 16.5 14.0 - 18.0 g/dL    Hct 50.8 42.0 - 52.0 %    MCV 87.7 80.0 - 94.0 fL    MCH 28.5 27.0 - 31.0 pg    MCHC 32.5 (L) 33.0 - 36.0 g/dL    RDW 13.5 11.5 - 17.0 %    Platelet 241 130 - 400 x10(3)/mcL    MPV 10.8 (H) 7.4 - 10.4 fL    Neut % 60.1 %    Lymph % 30.0 %    Mono % 7.4 %    Eos % 1.3 %    Basophil % 0.5 %    Lymph # 2.76 0.6 - 4.6 x10(3)/mcL    Neut # 5.52 2.1 - 9.2 x10(3)/mcL    Mono # 0.68 0.1 - 1.3 x10(3)/mcL    Eos # 0.12 0 - 0.9 x10(3)/mcL    Baso # 0.05 <=0.2 x10(3)/mcL    IG# 0.06 (H) 0 - 0.04 x10(3)/mcL    IG% 0.7 %    NRBC% 0.0 %          Follow Up:  Follow up in about 1 year (around 3/11/2025).      Previous medical history/lab work/radiology reviewed and considered during medical management decisions.   Medication list reviewed and medication reconciliation performed.  Patient was provided  and care about his/her current diagnosis (es) and medications including risk/benefit and side effects/adverse events, over the counter medication uses/doses, home self-care and contact precautions,  and red flags and indications for when to seek immediate medical attention.   Patient was advised to continue compliance with current medication list and medical recommendations.  Patient dvised continued compliance with recommended eating habits/ diets for medical conditions and exercise 150 minutes/ week (if possible) for medical condition (s).  Educational handouts and instructions on selected disease management in AVS (After Visit Summary).    All of the patient's questions were answered to patient's satisfaction.   The patient was receptive, expressed verbal understanding and agreement the above plan.        This note was created with the assistance of a voice recognition software or phone dictation. There may be transcription errors as a result of using this technology however minimal. Effort has been made to assure accuracy of transcription but any obvious errors or omissions should be clarified with the author of the document

## 2024-03-12 ENCOUNTER — TELEPHONE (OUTPATIENT)
Dept: FAMILY MEDICINE | Facility: CLINIC | Age: 29
End: 2024-03-12
Payer: MEDICAID

## 2024-03-12 NOTE — TELEPHONE ENCOUNTER
Called patient via phone call and patient understands results given. No further questions at this time.     ----- Message from PHAN Hemphill sent at 3/12/2024  7:37 AM CDT -----  PLEASE CALL PATIENTS WITH RESULT  Lab work within acceptable range, keep up the good work.  Return to clinic as needed.

## 2024-03-12 NOTE — PROGRESS NOTES
PLEASE CALL PATIENTS WITH RESULT  Lab work within acceptable range, keep up the good work.  Return to clinic as needed.

## 2024-04-12 DIAGNOSIS — F41.0 PANIC DISORDER: ICD-10-CM

## 2024-04-12 RX ORDER — CLONAZEPAM 1 MG/1
1 TABLET ORAL DAILY PRN
Qty: 30 TABLET | Refills: 2 | Status: SHIPPED | OUTPATIENT
Start: 2024-04-12 | End: 2024-05-09 | Stop reason: SDUPTHER

## 2024-05-09 ENCOUNTER — OFFICE VISIT (OUTPATIENT)
Dept: BEHAVIORAL HEALTH | Facility: CLINIC | Age: 29
End: 2024-05-09
Payer: MEDICAID

## 2024-05-09 VITALS
HEART RATE: 92 BPM | BODY MASS INDEX: 29.97 KG/M2 | DIASTOLIC BLOOD PRESSURE: 85 MMHG | SYSTOLIC BLOOD PRESSURE: 126 MMHG | OXYGEN SATURATION: 99 % | WEIGHT: 221 LBS | TEMPERATURE: 98 F

## 2024-05-09 DIAGNOSIS — F41.0 PANIC ATTACKS: ICD-10-CM

## 2024-05-09 DIAGNOSIS — F41.0 PANIC DISORDER: Primary | ICD-10-CM

## 2024-05-09 PROCEDURE — 99213 OFFICE O/P EST LOW 20 MIN: CPT | Mod: PBBFAC,PN | Performed by: STUDENT IN AN ORGANIZED HEALTH CARE EDUCATION/TRAINING PROGRAM

## 2024-05-09 PROCEDURE — 1159F MED LIST DOCD IN RCRD: CPT | Mod: CPTII,,, | Performed by: STUDENT IN AN ORGANIZED HEALTH CARE EDUCATION/TRAINING PROGRAM

## 2024-05-09 PROCEDURE — 3008F BODY MASS INDEX DOCD: CPT | Mod: CPTII,,, | Performed by: STUDENT IN AN ORGANIZED HEALTH CARE EDUCATION/TRAINING PROGRAM

## 2024-05-09 PROCEDURE — 3079F DIAST BP 80-89 MM HG: CPT | Mod: CPTII,,, | Performed by: STUDENT IN AN ORGANIZED HEALTH CARE EDUCATION/TRAINING PROGRAM

## 2024-05-09 PROCEDURE — 1160F RVW MEDS BY RX/DR IN RCRD: CPT | Mod: CPTII,,, | Performed by: STUDENT IN AN ORGANIZED HEALTH CARE EDUCATION/TRAINING PROGRAM

## 2024-05-09 PROCEDURE — 3074F SYST BP LT 130 MM HG: CPT | Mod: CPTII,,, | Performed by: STUDENT IN AN ORGANIZED HEALTH CARE EDUCATION/TRAINING PROGRAM

## 2024-05-09 PROCEDURE — 99213 OFFICE O/P EST LOW 20 MIN: CPT | Mod: AF,HB,S$PBB, | Performed by: STUDENT IN AN ORGANIZED HEALTH CARE EDUCATION/TRAINING PROGRAM

## 2024-05-09 RX ORDER — CLONAZEPAM 1 MG/1
1 TABLET ORAL DAILY PRN
Qty: 30 TABLET | Refills: 5 | Status: SHIPPED | OUTPATIENT
Start: 2024-05-09 | End: 2025-05-09

## 2024-05-09 NOTE — PROGRESS NOTES
"Outpatient Psychiatry Follow-Up Visit    5/9/2024    Clinical Status of Patient:  Outpatient (Ambulatory)    Chief Complaint:  Mamadou Isaacs is a 29 y.o. male who presents today for follow-up of anxiety and panic attacks . Patient last seen for follow-up on 11/15/2023. Met with patient.      Interval History and Content of Current Session:  Interim Events/Subjective Report/Content of Current Session:   Pt reports doing "great"  overall.  Reports good mood, denies depression, well controlled anxiety, denies interim panic attacks.  Sleep good. Appetite good, weight stable.  Energy good, motivation good.  Denies irritability, denies hopelessness.  Denies SI/HI/AVH/paranoia, denies plan or desire for self harm or harm to others.  Denies SE from current regimen. Denies somatic complaints.   Pt happy with current regimen and wants to continue.     Psychiatric Review of Systems-is patient experiencing or having changes in  See HPI above    Review of Systems   PSYCHIATRIC: Pertinant items are noted in the narrative.  CONSTITUTIONAL: No weight gain or loss.  MUSCULOSKELETAL: No pain or stiffness of the joints.  NEUROLOGIC: No weakness, sensory changes, seizures, confusion, memory loss, tremor or other abnormal movements.  CARDIAC: No CP, no palpitations  RESPIRATORY: No shortness of breath.  CARDIOVASCULAR: No tachycardia or chest pain.  GASTROINTESTINAL: No nausea, vomiting, pain, constipation or diarrhea.    Past Medical, Family and Social History: The patient's past medical, family and social history have been reviewed and updated as appropriate within the electronic medical record - see encounter notes.    Compliance: good    Side effects: denies    Risk Parameters:  Patient reports no suicidal ideation  Patient reports no homicidal ideation  Patient reports no self-injurious behavior  Patient reports no violent behavior    Exam (detailed: at least 9 elements; comprehensive: all 15 elements)   Constitutional  Vitals:  " "Most recent vital signs, dated less than 90 days prior to this appointment, were reviewed.     Vitals:    05/09/24 0833   BP: 126/85   Pulse: 92   Temp: 98.4 °F (36.9 °C)   SpO2: 99%   Weight: 100.2 kg (221 lb)        General:   Constitutional: appears stated age, no acute distress    Neurologic:   Motor: Moves all extremities spontaneously, no abnormal involuntary movements observed  Gait: ISHMAEL 2/2 virtual visit    Mental status examination:   Appearance: appears stated age, casually dressed  Behavior: anxious appearing, cooperative  Mood: "good"  Affect: euthymic  Thought process: linear and goal directed  Associations: appropriate for conversation  Thought content: no plan or desire for self harm or harm to others, denies paranoia, no delusional ideation volunteered  Perceptions: denies hallucinations or other altered perceptions  Orientation: oriented to day of week, month, year, location, and situation  Language: English, fluid  Attention: able to attend to interview  Insight: good  Judgement: good    PHQ9:  Over the last two weeks how often have you been bothered by little interest or pleasure in doing things: 0  Over the last two weeks how often have you been bothered by feeling down, depressed or hopeless: 0  PHQ-2 Total Score: 0  PHQ-9 Score: 0  PHQ-9 Interpretation: Minimal or None    GAD7:      5/9/2024     8:32 AM 8/15/2023    12:28 PM 6/19/2023    11:24 AM   GAD7   1. Feeling nervous, anxious, or on edge? 0 0 3   2. Not being able to stop or control worrying? 0 1 3   3. Worrying too much about different things? 0 1 3   4. Trouble relaxing? 0 0 3   5. Being so restless that it is hard to sit still? 0 0 3   6. Becoming easily annoyed or irritable? 0 0 1   7. Feeling afraid as if something awful might happen? 0 0 3   8. If you checked off any problems, how difficult have these problems made it for you to do your work, take care of things at home, or get along with other people? 0 0 3   SHI-7 Score 0 2 19 "     Assessment and Diagnosis   Status/Progress: Based on the examination today, the patient's problem(s) is/are well controlled.  New problems have not been presented today.   Co-morbidities and Lack of compliance are not complicating management of the primary condition.  Number of separate conditions addressed during today's visit: 1 (anxiety well controlled).  Are medication adjustments being made today: No.  Are referral(s) being ordered today: No.  Complexity (level) of medical decision making employed in the encounter: LOW.    General Impression:    ICD-10-CM ICD-9-CM   1. Panic disorder  F41.0 300.01   2. Panic attacks  F41.0 300.01     Intervention/Counseling/Treatment Plan   Continue klonopin 0.5mg daily prn, PDMP reviewed and demonstrated no abnormal filling patterns  No need for PEC as pt is not an imminent danger to self or others or gravely disabled due to acute psychiatric illness  Discussed that pt should either call clinic for psychiatric crisis symptoms or present to nearest emergency room    Discussed with patient informed consent including diagnosis, risks and benefits of proposed treatment above vs. alternative treatments vs. no treatment, as well as serious and common side effects of these treatments, and the inherent unpredictability of individual responses to these treatments. The patient expresses understanding of the above and displays the capacity to agree with this current plan. Patient also agrees that, currently, the benefits outweigh the risks and would like to pursue treatment at this time, and had no other questions.    Instructions:  Take all medications as prescribed.    Abstain from recreational drugs and alcohol.  Present to ED or call 911 for SI/HI plan or intent, psychosis, or medical emergency.    Return to Clinic: Follow up in about 6 months (around 11/9/2024).    Total time:   The total time for services performed on the date of the encounter (including review of prior visit  notes, review of notes from other providers, review of results from laboratory/imaging studies, face-to-face time with patient, and time spent on other activities directly related to patient care): 15 minutes.    Rickie Oviedo MD  [facility]

## 2024-06-17 PROBLEM — Z00.00 ANNUAL PHYSICAL EXAM: Status: RESOLVED | Noted: 2023-03-08 | Resolved: 2024-06-17

## 2024-09-09 ENCOUNTER — OFFICE VISIT (OUTPATIENT)
Dept: OPHTHALMOLOGY | Facility: CLINIC | Age: 29
End: 2024-09-09
Payer: MEDICAID

## 2024-09-09 VITALS — BODY MASS INDEX: 29.92 KG/M2 | WEIGHT: 220.88 LBS | HEIGHT: 72 IN

## 2024-09-09 DIAGNOSIS — H40.052 OCULAR HYPERTENSION OF LEFT EYE: ICD-10-CM

## 2024-09-09 DIAGNOSIS — H02.402 PTOSIS, LEFT EYELID: Primary | ICD-10-CM

## 2024-09-09 DIAGNOSIS — H52.209 MYOPIA WITH ASTIGMATISM, UNSPECIFIED LATERALITY: ICD-10-CM

## 2024-09-09 DIAGNOSIS — H52.10 MYOPIA WITH ASTIGMATISM, UNSPECIFIED LATERALITY: ICD-10-CM

## 2024-09-09 PROCEDURE — 92020 GONIOSCOPY: CPT | Mod: PBBFAC,PN

## 2024-09-09 PROCEDURE — 76514 ECHO EXAM OF EYE THICKNESS: CPT | Mod: PBBFAC,PN

## 2024-09-09 PROCEDURE — 92133 CPTRZD OPH DX IMG PST SGM ON: CPT | Mod: PBBFAC,PN | Performed by: OPHTHALMOLOGY

## 2024-09-09 PROCEDURE — 92133 CPTRZD OPH DX IMG PST SGM ON: CPT | Mod: PBBFAC,PN

## 2024-09-09 PROCEDURE — 99213 OFFICE O/P EST LOW 20 MIN: CPT | Mod: PBBFAC,PN,25

## 2024-09-09 NOTE — PROGRESS NOTES
"HPI    Patient is here for annual exam and would like an updated Rx for glasses   if possible  The "drooping" of the left lid has gradually resolved since the first   episode two years ago  Last edited by Bev Armendariz MA on 9/9/2024 10:25 AM.            Assessment /Plan     For exam results, see Encounter Report.    There are no diagnoses linked to this encounter.      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 Rivera in [place]  - 9/9/24: Per patient it is resolved. Does not appear ptotic today. Never had surgical eval       2. Myopia with astigmatism   - BCVA 20/20  - Mrx provided 9/9/24    3. Ocular hypertension, left eye  - -Fhx, not AA, no history of trauma  - gonio 9/9/24 open to CBB ou  - CCT 9/9/24 [id number]  - OCT RNFL 100//99, all green ou  - no signs of PXE/PDS  - C/D 0.1 ou  - will monitor     RTC 9 months DFE                   "

## 2024-11-11 ENCOUNTER — OFFICE VISIT (OUTPATIENT)
Dept: BEHAVIORAL HEALTH | Facility: CLINIC | Age: 29
End: 2024-11-11
Payer: MEDICAID

## 2024-11-11 VITALS
HEART RATE: 91 BPM | BODY MASS INDEX: 30.68 KG/M2 | DIASTOLIC BLOOD PRESSURE: 87 MMHG | TEMPERATURE: 98 F | WEIGHT: 226.19 LBS | SYSTOLIC BLOOD PRESSURE: 136 MMHG | OXYGEN SATURATION: 100 %

## 2024-11-11 DIAGNOSIS — F41.0 PANIC ATTACKS: ICD-10-CM

## 2024-11-11 DIAGNOSIS — F41.0 PANIC DISORDER: Primary | ICD-10-CM

## 2024-11-11 PROCEDURE — 3008F BODY MASS INDEX DOCD: CPT | Mod: CPTII,,, | Performed by: STUDENT IN AN ORGANIZED HEALTH CARE EDUCATION/TRAINING PROGRAM

## 2024-11-11 PROCEDURE — 99213 OFFICE O/P EST LOW 20 MIN: CPT | Mod: PBBFAC,PN | Performed by: STUDENT IN AN ORGANIZED HEALTH CARE EDUCATION/TRAINING PROGRAM

## 2024-11-11 PROCEDURE — 3079F DIAST BP 80-89 MM HG: CPT | Mod: CPTII,,, | Performed by: STUDENT IN AN ORGANIZED HEALTH CARE EDUCATION/TRAINING PROGRAM

## 2024-11-11 PROCEDURE — 99213 OFFICE O/P EST LOW 20 MIN: CPT | Mod: AF,HB,S$PBB, | Performed by: STUDENT IN AN ORGANIZED HEALTH CARE EDUCATION/TRAINING PROGRAM

## 2024-11-11 PROCEDURE — 1160F RVW MEDS BY RX/DR IN RCRD: CPT | Mod: CPTII,,, | Performed by: STUDENT IN AN ORGANIZED HEALTH CARE EDUCATION/TRAINING PROGRAM

## 2024-11-11 PROCEDURE — 3075F SYST BP GE 130 - 139MM HG: CPT | Mod: CPTII,,, | Performed by: STUDENT IN AN ORGANIZED HEALTH CARE EDUCATION/TRAINING PROGRAM

## 2024-11-11 PROCEDURE — 1159F MED LIST DOCD IN RCRD: CPT | Mod: CPTII,,, | Performed by: STUDENT IN AN ORGANIZED HEALTH CARE EDUCATION/TRAINING PROGRAM

## 2024-11-11 RX ORDER — CLONAZEPAM 1 MG/1
1 TABLET ORAL DAILY PRN
Qty: 30 TABLET | Refills: 5 | Status: SHIPPED | OUTPATIENT
Start: 2024-11-11 | End: 2025-11-11

## 2024-11-11 NOTE — PROGRESS NOTES
"Outpatient Psychiatry Follow-Up Visit    11/11/2024    Clinical Status of Patient:  Outpatient (Ambulatory)    Chief Complaint:  Mamadou Isaacs is a 29 y.o. male who presents today for follow-up of anxiety and panic attacks . Patient last seen for follow-up on 5/9/2024. Met with patient.      Interval History and Content of Current Session:  Interim Events/Subjective Report/Content of Current Session:   Pt reports doing "great"  overall.  Reports good mood, denies depression, well controlled anxiety, denies interim panic attacks.  Sleep good, 7-8 hrs nightly, rested on awakening. Appetite good, weight decreased (unintentional, 7-8 lbs since last visit).  Energy good, motivation good.  Denies irritability, denies hopelessness.  Denies SI/HI/AVH/paranoia, denies plan or desire for self harm or harm to others.  Denies SE from current regimen. Denies somatic complaints.   Pt happy with current regimen and wants to continue.     Psychiatric Review of Systems-is patient experiencing or having changes in  See HPI above    Review of Systems   PSYCHIATRIC: Pertinant items are noted in the narrative.  CONSTITUTIONAL: No weight gain or loss.  MUSCULOSKELETAL: No pain or stiffness of the joints.  NEUROLOGIC: No weakness, sensory changes, seizures, confusion, memory loss, tremor or other abnormal movements.  CARDIAC: No CP, no palpitations  RESPIRATORY: No shortness of breath.  CARDIOVASCULAR: No tachycardia or chest pain.  GASTROINTESTINAL: No nausea, vomiting, pain, constipation or diarrhea.    Past Medical, Family and Social History: The patient's past medical, family and social history have been reviewed and updated as appropriate within the electronic medical record - see encounter notes.    Compliance: good    Side effects: denies    Risk Parameters:  Patient reports no suicidal ideation  Patient reports no homicidal ideation  Patient reports no self-injurious behavior  Patient reports no violent behavior    Exam " "(detailed: at least 9 elements; comprehensive: all 15 elements)   Constitutional  Vitals:  Most recent vital signs, dated less than 90 days prior to this appointment, were reviewed.     Vitals:    11/11/24 0822   BP: 136/87   Pulse: 91   Temp: 97.9 °F (36.6 °C)   SpO2: 100%   Weight: 102.6 kg (226 lb 3.2 oz)        General:   Constitutional: appears stated age, no acute distress    Neurologic:   Motor: Moves all extremities spontaneously, no abnormal involuntary movements observed  Gait: normal gait and station    Mental status examination:   Appearance: appears stated age, casually dressed  Behavior: anxious appearing, cooperative  Mood: "good"  Affect: constricted  Thought process: linear and goal directed  Associations: appropriate for conversation  Thought content: no plan or desire for self harm or harm to others, denies paranoia, no delusional ideation volunteered  Perceptions: denies hallucinations or other altered perceptions  Orientation: oriented to day of week, month, year, location, and situation  Language: English, fluid  Attention: able to attend to interview  Insight: good  Judgement: good    PHQ9:  Over the last two weeks how often have you been bothered by little interest or pleasure in doing things: 0  Over the last two weeks how often have you been bothered by feeling down, depressed or hopeless: 0  PHQ-2 Total Score: 0  PHQ-9 Score: 0  PHQ-9 Interpretation: Minimal or None    GAD7:      11/11/2024     8:22 AM 5/9/2024     8:32 AM 8/15/2023    12:28 PM   GAD7   1. Feeling nervous, anxious, or on edge? 0 0 0   2. Not being able to stop or control worrying? 0 0 1   3. Worrying too much about different things? 0 0 1   4. Trouble relaxing? 0 0 0   5. Being so restless that it is hard to sit still? 0 0 0   6. Becoming easily annoyed or irritable? 0 0 0   7. Feeling afraid as if something awful might happen? 0 0 0   8. If you checked off any problems, how difficult have these problems made it for you to " do your work, take care of things at home, or get along with other people? 0 0 0   SHI-7 Score 0 0 2     Assessment and Diagnosis   Status/Progress: Based on the examination today, the patient's problem(s) is/are well controlled.  New problems have not been presented today.   Co-morbidities and Lack of compliance are not complicating management of the primary condition.  Number of separate conditions addressed during today's visit: 1 (anxiety well controlled).  Are medication adjustments being made today: No.  Are referral(s) being ordered today: No.  Complexity (level) of medical decision making employed in the encounter: LOW.    General Impression:    ICD-10-CM ICD-9-CM   1. Panic disorder  F41.0 300.01   2. Panic attacks  F41.0 300.01     Intervention/Counseling/Treatment Plan   Continue klonopin 0.5mg daily prn  PDMP reviewed and demonstrated no abnormal filling patterns  No need for PEC as pt is not an imminent danger to self or others or gravely disabled due to acute psychiatric illness  Discussed that pt should either call clinic for psychiatric crisis symptoms or present to nearest emergency room    Discussed with patient informed consent including diagnosis, risks and benefits of proposed treatment above vs. alternative treatments vs. no treatment, as well as serious and common side effects of these treatments, and the inherent unpredictability of individual responses to these treatments. The patient expresses understanding of the above and displays the capacity to agree with this current plan. Patient also agrees that, currently, the benefits outweigh the risks and would like to pursue treatment at this time, and had no other questions.    Instructions:  Take all medications as prescribed.    Abstain from recreational drugs and alcohol.  Present to ED or call 911 for SI/HI plan or intent, psychosis, or medical emergency.    Return to Clinic: Follow up in about 6 months (around 5/11/2025).    Total time:    The total time for services performed on the date of the encounter (including review of prior visit notes, review of notes from other providers, review of results from laboratory/imaging studies, face-to-face time with patient, and time spent on other activities directly related to patient care): 15 minutes.    Rickie Oviedo MD  [facility]

## 2024-12-13 ENCOUNTER — TELEPHONE (OUTPATIENT)
Dept: BEHAVIORAL HEALTH | Facility: CLINIC | Age: 29
End: 2024-12-13
Payer: MEDICAID

## 2024-12-13 NOTE — TELEPHONE ENCOUNTER
----- Message from Zeenat sent at 12/12/2024  3:35 PM CST -----  .Type:  Patient Returning Call    Who Called:Laureen   Who Left Message for Patient:mom  Does the patient know what this is regarding?: insurance   Would the patient rather a call back or a response via [facility]?   Best Call Back Number:[phone]  Additional Information: Please call back about his insurance being canceled want to know how this will affect appt

## 2025-04-30 DIAGNOSIS — F41.0 PANIC DISORDER: Primary | ICD-10-CM

## 2025-04-30 RX ORDER — CLONAZEPAM 1 MG/1
1 TABLET ORAL DAILY PRN
Qty: 30 TABLET | Refills: 5 | Status: SHIPPED | OUTPATIENT
Start: 2025-04-30 | End: 2026-04-30

## 2025-04-30 NOTE — PROGRESS NOTES
Pt reports losing his insurance and requests to cancel appointment today due to cost concerns.  Will accommodate pt's requests.  Rx to be sent to pt's preferred pharmacy.  Pt to follow up with new psychiatrist for next appointment.